# Patient Record
Sex: MALE | Race: WHITE | Employment: OTHER | URBAN - METROPOLITAN AREA
[De-identification: names, ages, dates, MRNs, and addresses within clinical notes are randomized per-mention and may not be internally consistent; named-entity substitution may affect disease eponyms.]

---

## 2017-06-30 ENCOUNTER — HOSPITAL ENCOUNTER (OUTPATIENT)
Dept: GENERAL RADIOLOGY | Age: 62
Discharge: HOME | End: 2017-06-30

## 2017-06-30 ENCOUNTER — OFFICE VISIT (OUTPATIENT)
Dept: FAMILY MEDICINE CLINIC | Age: 62
End: 2017-06-30

## 2017-06-30 ENCOUNTER — HOSPITAL ENCOUNTER (OUTPATIENT)
Dept: OTHER | Age: 62
Discharge: HOME | End: 2017-06-30
Attending: FAMILY MEDICINE | Admitting: FAMILY MEDICINE

## 2017-06-30 VITALS
BODY MASS INDEX: 29.93 KG/M2 | HEART RATE: 76 BPM | SYSTOLIC BLOOD PRESSURE: 136 MMHG | HEIGHT: 72 IN | WEIGHT: 221 LBS | DIASTOLIC BLOOD PRESSURE: 88 MMHG | OXYGEN SATURATION: 98 %

## 2017-06-30 DIAGNOSIS — Z23 NEED FOR TDAP VACCINATION: ICD-10-CM

## 2017-06-30 DIAGNOSIS — R07.9 CHEST PAIN, UNSPECIFIED TYPE: ICD-10-CM

## 2017-06-30 DIAGNOSIS — E03.9 HYPOTHYROIDISM, UNSPECIFIED TYPE: ICD-10-CM

## 2017-06-30 DIAGNOSIS — Z13.9 SCREENING: ICD-10-CM

## 2017-06-30 DIAGNOSIS — L71.9 ROSACEA: Primary | ICD-10-CM

## 2017-06-30 DIAGNOSIS — R73.01 ELEVATED FASTING GLUCOSE: ICD-10-CM

## 2017-06-30 DIAGNOSIS — K21.9 GASTROESOPHAGEAL REFLUX DISEASE, ESOPHAGITIS PRESENCE NOT SPECIFIED: ICD-10-CM

## 2017-06-30 LAB
A/G RATIO: 2.2 (ref 1.1–2.2)
ALBUMIN SERPL-MCNC: 4.4 G/DL (ref 3.4–5)
ALP BLD-CCNC: 49 U/L (ref 40–129)
ALT SERPL-CCNC: 24 U/L (ref 10–40)
ANION GAP SERPL CALCULATED.3IONS-SCNC: 16 MMOL/L (ref 3–16)
AST SERPL-CCNC: 18 U/L (ref 15–37)
BASOPHILS ABSOLUTE: 0.1 K/UL (ref 0–0.2)
BASOPHILS RELATIVE PERCENT: 1.1 %
BILIRUB SERPL-MCNC: 0.4 MG/DL (ref 0–1)
BUN BLDV-MCNC: 16 MG/DL (ref 7–20)
CALCIUM SERPL-MCNC: 10 MG/DL (ref 8.3–10.6)
CHLORIDE BLD-SCNC: 98 MMOL/L (ref 99–110)
CO2: 24 MMOL/L (ref 21–32)
CREAT SERPL-MCNC: 1 MG/DL (ref 0.8–1.3)
EOSINOPHILS ABSOLUTE: 0.4 K/UL (ref 0–0.6)
EOSINOPHILS RELATIVE PERCENT: 5.7 %
GFR AFRICAN AMERICAN: >60
GFR NON-AFRICAN AMERICAN: >60
GLOBULIN: 2 G/DL
GLUCOSE BLD-MCNC: 104 MG/DL (ref 70–99)
HAV IGM SER IA-ACNC: NORMAL
HCT VFR BLD CALC: 44 % (ref 40.5–52.5)
HEMOGLOBIN: 14.6 G/DL (ref 13.5–17.5)
HEPATITIS B CORE IGM ANTIBODY: NORMAL
HEPATITIS B SURFACE ANTIGEN INTERPRETATION: NORMAL
HEPATITIS C ANTIBODY INTERPRETATION: NORMAL
LYMPHOCYTES ABSOLUTE: 2 K/UL (ref 1–5.1)
LYMPHOCYTES RELATIVE PERCENT: 29.2 %
MCH RBC QN AUTO: 32.3 PG (ref 26–34)
MCHC RBC AUTO-ENTMCNC: 33.1 G/DL (ref 31–36)
MCV RBC AUTO: 97.6 FL (ref 80–100)
MONOCYTES ABSOLUTE: 0.5 K/UL (ref 0–1.3)
MONOCYTES RELATIVE PERCENT: 7.8 %
NEUTROPHILS ABSOLUTE: 3.8 K/UL (ref 1.7–7.7)
NEUTROPHILS RELATIVE PERCENT: 56.2 %
PDW BLD-RTO: 14.4 % (ref 12.4–15.4)
PLATELET # BLD: 232 K/UL (ref 135–450)
PMV BLD AUTO: 9.4 FL (ref 5–10.5)
POTASSIUM SERPL-SCNC: 4.7 MMOL/L (ref 3.5–5.1)
RBC # BLD: 4.51 M/UL (ref 4.2–5.9)
SODIUM BLD-SCNC: 138 MMOL/L (ref 136–145)
TOTAL PROTEIN: 6.4 G/DL (ref 6.4–8.2)
TSH SERPL DL<=0.05 MIU/L-ACNC: 2.31 UIU/ML (ref 0.27–4.2)
WBC # BLD: 6.7 K/UL (ref 4–11)

## 2017-06-30 PROCEDURE — 90471 IMMUNIZATION ADMIN: CPT | Performed by: FAMILY MEDICINE

## 2017-06-30 PROCEDURE — 90715 TDAP VACCINE 7 YRS/> IM: CPT | Performed by: FAMILY MEDICINE

## 2017-06-30 PROCEDURE — 36415 COLL VENOUS BLD VENIPUNCTURE: CPT | Performed by: FAMILY MEDICINE

## 2017-06-30 PROCEDURE — 93000 ELECTROCARDIOGRAM COMPLETE: CPT | Performed by: FAMILY MEDICINE

## 2017-06-30 PROCEDURE — 99203 OFFICE O/P NEW LOW 30 MIN: CPT | Performed by: FAMILY MEDICINE

## 2017-06-30 RX ORDER — CHOLECALCIFEROL (VITAMIN D3) 1250 MCG
CAPSULE ORAL
COMMUNITY
End: 2018-05-09

## 2017-06-30 RX ORDER — AZELAIC ACID 0.15 G/G
GEL TOPICAL SEE ADMIN INSTRUCTIONS
COMMUNITY
End: 2019-05-10

## 2017-06-30 RX ORDER — MINOCYCLINE HYDROCHLORIDE 100 MG/1
100 CAPSULE ORAL 2 TIMES DAILY
COMMUNITY
End: 2017-10-24 | Stop reason: SDUPTHER

## 2017-06-30 RX ORDER — LEVOTHYROXINE SODIUM 0.05 MG/1
50 TABLET ORAL DAILY
COMMUNITY
End: 2017-09-26 | Stop reason: SDUPTHER

## 2017-06-30 RX ORDER — OMEPRAZOLE 20 MG/1
20 CAPSULE, DELAYED RELEASE ORAL 2 TIMES DAILY
COMMUNITY
End: 2017-10-24

## 2017-06-30 ASSESSMENT — ENCOUNTER SYMPTOMS
COUGH: 0
ABDOMINAL DISTENTION: 0
CONSTIPATION: 0
CHEST TIGHTNESS: 0
ANAL BLEEDING: 0
BLOOD IN STOOL: 0
ABDOMINAL PAIN: 0
SHORTNESS OF BREATH: 0
EYE DISCHARGE: 0
DIARRHEA: 0

## 2017-06-30 ASSESSMENT — PATIENT HEALTH QUESTIONNAIRE - PHQ9
SUM OF ALL RESPONSES TO PHQ9 QUESTIONS 1 & 2: 0
SUM OF ALL RESPONSES TO PHQ QUESTIONS 1-9: 0
1. LITTLE INTEREST OR PLEASURE IN DOING THINGS: 0
2. FEELING DOWN, DEPRESSED OR HOPELESS: 0

## 2017-07-01 LAB
ESTIMATED AVERAGE GLUCOSE: 105.4 MG/DL
HBA1C MFR BLD: 5.3 %

## 2017-07-28 ENCOUNTER — OFFICE VISIT (OUTPATIENT)
Dept: FAMILY MEDICINE CLINIC | Age: 62
End: 2017-07-28

## 2017-07-28 VITALS
DIASTOLIC BLOOD PRESSURE: 88 MMHG | SYSTOLIC BLOOD PRESSURE: 138 MMHG | HEART RATE: 72 BPM | OXYGEN SATURATION: 98 % | BODY MASS INDEX: 29.97 KG/M2 | WEIGHT: 221 LBS

## 2017-07-28 DIAGNOSIS — Z12.11 ENCOUNTER FOR SCREENING COLONOSCOPY: ICD-10-CM

## 2017-07-28 DIAGNOSIS — K21.9 GASTROESOPHAGEAL REFLUX DISEASE, ESOPHAGITIS PRESENCE NOT SPECIFIED: Primary | ICD-10-CM

## 2017-07-28 PROCEDURE — 99213 OFFICE O/P EST LOW 20 MIN: CPT | Performed by: FAMILY MEDICINE

## 2017-07-31 ASSESSMENT — ENCOUNTER SYMPTOMS
SHORTNESS OF BREATH: 0
BLOOD IN STOOL: 0
CHEST TIGHTNESS: 0
ANAL BLEEDING: 0
COUGH: 0
DIARRHEA: 0
ABDOMINAL PAIN: 0
ABDOMINAL DISTENTION: 0
CONSTIPATION: 0

## 2017-08-23 ENCOUNTER — HOSPITAL ENCOUNTER (OUTPATIENT)
Dept: SURGERY | Age: 62
Discharge: HOME | End: 2017-08-23
Attending: INTERNAL MEDICINE | Admitting: INTERNAL MEDICINE

## 2017-08-23 VITALS
RESPIRATION RATE: 18 BRPM | HEIGHT: 72 IN | TEMPERATURE: 97.2 F | OXYGEN SATURATION: 98 % | HEART RATE: 70 BPM | SYSTOLIC BLOOD PRESSURE: 126 MMHG | BODY MASS INDEX: 29.8 KG/M2 | WEIGHT: 220 LBS | DIASTOLIC BLOOD PRESSURE: 76 MMHG

## 2017-08-23 DIAGNOSIS — Z12.11 ENCOUNTER FOR SCREENING FOR MALIGNANT NEOPLASM OF COLON: ICD-10-CM

## 2017-08-23 RX ORDER — LIDOCAINE HYDROCHLORIDE 10 MG/ML
0.1 INJECTION, SOLUTION INFILTRATION; PERINEURAL ONCE
Status: DISCONTINUED | OUTPATIENT
Start: 2017-08-23 | End: 2017-08-24 | Stop reason: HOSPADM

## 2017-08-23 RX ORDER — FLUTICASONE PROPIONATE 50 MCG
1 SPRAY, SUSPENSION (ML) NASAL DAILY
COMMUNITY

## 2017-08-23 RX ORDER — SODIUM CHLORIDE, SODIUM LACTATE, POTASSIUM CHLORIDE, CALCIUM CHLORIDE 600; 310; 30; 20 MG/100ML; MG/100ML; MG/100ML; MG/100ML
INJECTION, SOLUTION INTRAVENOUS CONTINUOUS
Status: DISCONTINUED | OUTPATIENT
Start: 2017-08-23 | End: 2017-08-24 | Stop reason: HOSPADM

## 2017-08-23 RX ADMIN — SODIUM CHLORIDE, SODIUM LACTATE, POTASSIUM CHLORIDE, CALCIUM CHLORIDE: 600; 310; 30; 20 INJECTION, SOLUTION INTRAVENOUS at 10:15

## 2017-08-23 ASSESSMENT — PAIN - FUNCTIONAL ASSESSMENT
PAIN_FUNCTIONAL_ASSESSMENT: 0-10
PAIN_FUNCTIONAL_ASSESSMENT: 0-10

## 2017-08-23 ASSESSMENT — PAIN SCALES - GENERAL
PAINLEVEL_OUTOF10: 0

## 2017-09-26 ENCOUNTER — TELEPHONE (OUTPATIENT)
Dept: FAMILY MEDICINE CLINIC | Age: 62
End: 2017-09-26

## 2017-09-26 DIAGNOSIS — E03.9 HYPOTHYROIDISM, UNSPECIFIED TYPE: Primary | ICD-10-CM

## 2017-09-26 PROCEDURE — 36415 COLL VENOUS BLD VENIPUNCTURE: CPT | Performed by: FAMILY MEDICINE

## 2017-09-26 RX ORDER — LEVOTHYROXINE SODIUM 0.05 MG/1
50 TABLET ORAL DAILY
Qty: 90 TABLET | Refills: 1 | Status: SHIPPED | OUTPATIENT
Start: 2017-09-26 | End: 2017-12-14

## 2017-09-29 ENCOUNTER — NURSE ONLY (OUTPATIENT)
Dept: FAMILY MEDICINE CLINIC | Age: 62
End: 2017-09-29

## 2017-09-29 DIAGNOSIS — E03.9 HYPOTHYROIDISM, UNSPECIFIED TYPE: Primary | ICD-10-CM

## 2017-09-29 LAB — TSH SERPL DL<=0.05 MIU/L-ACNC: 4.21 UIU/ML (ref 0.27–4.2)

## 2017-09-29 PROCEDURE — 36415 COLL VENOUS BLD VENIPUNCTURE: CPT | Performed by: FAMILY MEDICINE

## 2017-10-06 DIAGNOSIS — E03.9 HYPOTHYROIDISM, UNSPECIFIED TYPE: Primary | ICD-10-CM

## 2017-10-24 ENCOUNTER — OFFICE VISIT (OUTPATIENT)
Dept: FAMILY MEDICINE CLINIC | Age: 62
End: 2017-10-24

## 2017-10-24 DIAGNOSIS — K22.4 ESOPHAGEAL SPASM: ICD-10-CM

## 2017-10-24 DIAGNOSIS — K21.9 GASTROESOPHAGEAL REFLUX DISEASE, ESOPHAGITIS PRESENCE NOT SPECIFIED: Primary | ICD-10-CM

## 2017-10-24 PROCEDURE — G8427 DOCREV CUR MEDS BY ELIG CLIN: HCPCS | Performed by: FAMILY MEDICINE

## 2017-10-24 PROCEDURE — 3017F COLORECTAL CA SCREEN DOC REV: CPT | Performed by: FAMILY MEDICINE

## 2017-10-24 PROCEDURE — G8484 FLU IMMUNIZE NO ADMIN: HCPCS | Performed by: FAMILY MEDICINE

## 2017-10-24 PROCEDURE — 99213 OFFICE O/P EST LOW 20 MIN: CPT | Performed by: FAMILY MEDICINE

## 2017-10-24 PROCEDURE — G8417 CALC BMI ABV UP PARAM F/U: HCPCS | Performed by: FAMILY MEDICINE

## 2017-10-24 PROCEDURE — 1036F TOBACCO NON-USER: CPT | Performed by: FAMILY MEDICINE

## 2017-10-24 RX ORDER — ESOMEPRAZOLE MAGNESIUM 40 MG/1
40 CAPSULE, DELAYED RELEASE ORAL DAILY
Qty: 30 CAPSULE | Refills: 1 | Status: SHIPPED | OUTPATIENT
Start: 2017-10-24 | End: 2018-05-09

## 2017-10-24 RX ORDER — MINOCYCLINE HYDROCHLORIDE 100 MG/1
100 CAPSULE ORAL 2 TIMES DAILY
Qty: 180 CAPSULE | Refills: 3 | Status: SHIPPED | OUTPATIENT
Start: 2017-10-24 | End: 2018-05-10 | Stop reason: ALTCHOICE

## 2017-10-24 ASSESSMENT — ENCOUNTER SYMPTOMS
ABDOMINAL DISTENTION: 0
NAUSEA: 1
CHEST TIGHTNESS: 0
COUGH: 0
ABDOMINAL PAIN: 0
SHORTNESS OF BREATH: 0
VOMITING: 0
TROUBLE SWALLOWING: 1

## 2017-10-24 NOTE — PROGRESS NOTES
Subjective:      Patient ID: Jeanette Dancer is a 60 y.o. male presenting with atypical substernal chest pain    HPI Pt visited the office in July and was started on 2 Omeprazole with success. In the last 3 weeks, he states the medication has not been working as well as previously. He describes atypical substernal chest pain with no radiation and no relation to exertation. Had clean EKG last office visit. No known precipitating factors besides not taking omeprazole then eating, however, sometimes will happen in sleep and wake him up. Morning nausea brought on by taking pills, but subsides after 30-45 minutes. Vomited <5 times since July, and has no history of vomiting undigested food. Eating less in one meal than normal, but can eat multiple smaller meals and reports no weight loss. Actually has gained weight due to less exercise. Review of Systems   Constitutional: Positive for appetite change (less in one meal). Negative for activity change and unexpected weight change. HENT: Positive for trouble swallowing (only with dry food, or at night). Respiratory: Negative for cough, chest tightness and shortness of breath. Cardiovascular: Positive for chest pain (atypical, no radiation, not relieved with rest or exacerbated with exercise). Gastrointestinal: Positive for nausea. Negative for abdominal distention, abdominal pain and vomiting. /80   Pulse 73   Wt 223 lb (101.2 kg)   SpO2 98%   BMI 30.24 kg/m²    Objective:   Physical Exam   Constitutional: He appears well-developed and well-nourished. HENT:   Head: Normocephalic and atraumatic. Eyes: Conjunctivae are normal. Right eye exhibits no discharge. Left eye exhibits no discharge. No scleral icterus. Neck: Normal range of motion. Neck supple. Cardiovascular: Normal rate, regular rhythm, normal heart sounds and intact distal pulses. Exam reveals no gallop and no friction rub. No murmur heard.   Pulmonary/Chest: Effort normal and breath sounds normal. No respiratory distress. He has no wheezes. He has no rales. He exhibits no tenderness. Abdominal: Soft. Bowel sounds are normal.   Skin: Skin is warm. No rash noted. BP (!) 148/90 (Site: Left Arm, Position: Sitting)   Pulse 73   Wt 223 lb (101.2 kg)   SpO2 98%   BMI 30.24 kg/m²       Assessment:      See Below      Plan:       Change prilosec to nexium and refer to GI for further eval      Zackary Medina was seen today for spasms and nausea. Diagnoses and all orders for this visit:    Gastroesophageal reflux disease, esophagitis presence not specified  -     Amb External Referral To Gastroenterology    Esophageal spasm  -     Amb External Referral To Gastroenterology    Other orders  -     minocycline (MINOCIN;DYNACIN) 100 MG capsule; Take 1 capsule by mouth 2 times daily  -     esomeprazole (NEXIUM) 40 MG delayed release capsule;  Take 1 capsule by mouth daily  -  D/c omeprazole

## 2017-10-25 VITALS
OXYGEN SATURATION: 98 % | SYSTOLIC BLOOD PRESSURE: 130 MMHG | BODY MASS INDEX: 30.24 KG/M2 | DIASTOLIC BLOOD PRESSURE: 80 MMHG | HEART RATE: 73 BPM | WEIGHT: 223 LBS

## 2017-10-27 DIAGNOSIS — G47.30 SLEEP APNEA, UNSPECIFIED TYPE: Primary | ICD-10-CM

## 2017-11-06 ENCOUNTER — HOSPITAL ENCOUNTER (OUTPATIENT)
Dept: SURGERY | Age: 62
Discharge: HOME | End: 2017-11-06
Attending: INTERNAL MEDICINE | Admitting: INTERNAL MEDICINE

## 2017-11-06 VITALS
SYSTOLIC BLOOD PRESSURE: 160 MMHG | HEART RATE: 68 BPM | RESPIRATION RATE: 16 BRPM | TEMPERATURE: 97.6 F | WEIGHT: 220 LBS | OXYGEN SATURATION: 98 % | DIASTOLIC BLOOD PRESSURE: 95 MMHG | BODY MASS INDEX: 29.8 KG/M2 | HEIGHT: 72 IN

## 2017-11-06 DIAGNOSIS — Z87.19 PERSONAL HISTORY OF OTHER DISEASES OF THE DIGESTIVE SYSTEM (CODE): ICD-10-CM

## 2017-11-06 RX ORDER — SODIUM CHLORIDE, SODIUM LACTATE, POTASSIUM CHLORIDE, CALCIUM CHLORIDE 600; 310; 30; 20 MG/100ML; MG/100ML; MG/100ML; MG/100ML
INJECTION, SOLUTION INTRAVENOUS CONTINUOUS
Status: DISCONTINUED | OUTPATIENT
Start: 2017-11-06 | End: 2017-11-07 | Stop reason: HOSPADM

## 2017-11-06 RX ORDER — LIDOCAINE HYDROCHLORIDE 10 MG/ML
0.1 INJECTION, SOLUTION INFILTRATION; PERINEURAL ONCE
Status: DISCONTINUED | OUTPATIENT
Start: 2017-11-06 | End: 2017-11-07 | Stop reason: HOSPADM

## 2017-11-06 RX ADMIN — SODIUM CHLORIDE, SODIUM LACTATE, POTASSIUM CHLORIDE, CALCIUM CHLORIDE: 600; 310; 30; 20 INJECTION, SOLUTION INTRAVENOUS at 13:05

## 2017-11-06 ASSESSMENT — PAIN SCALES - GENERAL
PAINLEVEL_OUTOF10: 0
PAINLEVEL_OUTOF10: 0

## 2017-11-06 ASSESSMENT — PAIN - FUNCTIONAL ASSESSMENT: PAIN_FUNCTIONAL_ASSESSMENT: 0-10

## 2017-11-06 NOTE — BRIEF OP NOTE
Post-Sedation Assessment  Clair Parker   11/6/2017  A pre-sedation re-evaluation was performed immediately prior to beginning the procedure.   Procedure: EGD  Preprocedure Dx: chest pain  Postprocedure Dx: irregular zline  Medications: Procedural sedation with Fentanyl, Versed  Complications: None  Estimated Blood Loss: <10cc  Specimens: were obtained    Harvinder Roberts

## 2017-11-06 NOTE — PROGRESS NOTES
Discharge instructions reviewed with patient/responsible adult and understanding verbalized. Discharge instructions signed and copies given. Patient discharged home with belongings. Discharge in no distress: accompanied pt to passenger side of car with family/significant other driving. Resp WNL. Pt's significant other verbalized understanding of d/c instructions and verbalizes no additional questions.  Pain  in a tolerable level=0

## 2017-11-07 NOTE — PROCEDURES
mucosal abnormalities nor gastric fold  thickening. Scope was then withdrawn through the GE junction. A  slightly irregular Z-line with one salmon-colored island of mucosa  located proximal to the Z-line by 1 cm was noted. These areas were  biopsied. No obvious ulcerations, nodules or masses were seen  throughout the esophagus. Biopsies of the mid and proximal esophagus  were obtained to screen for eosinophilic esophagitis. The scope was  then withdrawn from the patient. The procedure was terminated, was  well tolerated, and no immediate complications. POSTPROCEDURE DIAGNOSIS:  Irregular Z-line. An obvious etiology of this patient's chest pain is not seen on this  examination. It is possible that his pain may be related to acid  reflux or perhaps an esophageal motility disorder such as diffuse  esophageal spasm. I think achalasia is unlikely. I will contact him  with biopsy results next week. I have not made changes to his  medication regimen at this time.         Merlin Cai, MD    D: 11/06/2017 14:24:53       T: 11/06/2017 14:38:46     [email or web address]  Job#: [id number]     Doc#: [id number]    CC:  Alvin Recio MD

## 2017-11-17 ENCOUNTER — OFFICE VISIT (OUTPATIENT)
Dept: FAMILY MEDICINE CLINIC | Age: 62
End: 2017-11-17

## 2017-11-17 VITALS
HEART RATE: 88 BPM | BODY MASS INDEX: 30.92 KG/M2 | OXYGEN SATURATION: 98 % | SYSTOLIC BLOOD PRESSURE: 130 MMHG | WEIGHT: 228 LBS | DIASTOLIC BLOOD PRESSURE: 80 MMHG

## 2017-11-17 DIAGNOSIS — K21.00 GASTROESOPHAGEAL REFLUX DISEASE WITH ESOPHAGITIS: Primary | ICD-10-CM

## 2017-11-17 DIAGNOSIS — Z23 NEED FOR INFLUENZA VACCINATION: ICD-10-CM

## 2017-11-17 LAB
A/G RATIO: 2.3 (ref 1.1–2.2)
ALBUMIN SERPL-MCNC: 4.3 G/DL (ref 3.4–5)
ALP BLD-CCNC: 50 U/L (ref 40–129)
ALT SERPL-CCNC: 29 U/L (ref 10–40)
ANION GAP SERPL CALCULATED.3IONS-SCNC: 16 MMOL/L (ref 3–16)
AST SERPL-CCNC: 21 U/L (ref 15–37)
BILIRUB SERPL-MCNC: 0.3 MG/DL (ref 0–1)
BUN BLDV-MCNC: 19 MG/DL (ref 7–20)
CALCIUM SERPL-MCNC: 10 MG/DL (ref 8.3–10.6)
CHLORIDE BLD-SCNC: 101 MMOL/L (ref 99–110)
CO2: 27 MMOL/L (ref 21–32)
CREAT SERPL-MCNC: 1.2 MG/DL (ref 0.8–1.3)
GFR AFRICAN AMERICAN: >60
GFR NON-AFRICAN AMERICAN: >60
GLOBULIN: 1.9 G/DL
GLUCOSE BLD-MCNC: 112 MG/DL (ref 70–99)
POTASSIUM SERPL-SCNC: 5.2 MMOL/L (ref 3.5–5.1)
SODIUM BLD-SCNC: 144 MMOL/L (ref 136–145)
TOTAL PROTEIN: 6.2 G/DL (ref 6.4–8.2)
TSH SERPL DL<=0.05 MIU/L-ACNC: 2.14 UIU/ML (ref 0.27–4.2)
VITAMIN B-12: 334 PG/ML (ref 211–911)

## 2017-11-17 PROCEDURE — 3017F COLORECTAL CA SCREEN DOC REV: CPT | Performed by: FAMILY MEDICINE

## 2017-11-17 PROCEDURE — 99213 OFFICE O/P EST LOW 20 MIN: CPT | Performed by: FAMILY MEDICINE

## 2017-11-17 PROCEDURE — G8427 DOCREV CUR MEDS BY ELIG CLIN: HCPCS | Performed by: FAMILY MEDICINE

## 2017-11-17 PROCEDURE — 90630 INFLUENZA, QUADV, 18-64 YRS, ID, PF, MICRO INJ, 0.1ML (FLUZONE QUADV, PF): CPT | Performed by: FAMILY MEDICINE

## 2017-11-17 PROCEDURE — 36415 COLL VENOUS BLD VENIPUNCTURE: CPT | Performed by: FAMILY MEDICINE

## 2017-11-17 PROCEDURE — 1036F TOBACCO NON-USER: CPT | Performed by: FAMILY MEDICINE

## 2017-11-17 PROCEDURE — G8417 CALC BMI ABV UP PARAM F/U: HCPCS | Performed by: FAMILY MEDICINE

## 2017-11-17 PROCEDURE — 90471 IMMUNIZATION ADMIN: CPT | Performed by: FAMILY MEDICINE

## 2017-11-17 PROCEDURE — G8484 FLU IMMUNIZE NO ADMIN: HCPCS | Performed by: FAMILY MEDICINE

## 2017-11-17 RX ORDER — RANITIDINE 150 MG/1
150 TABLET ORAL 2 TIMES DAILY
Qty: 60 TABLET | Refills: 1 | Status: SHIPPED | OUTPATIENT
Start: 2017-11-17 | End: 2018-05-09

## 2017-11-17 NOTE — PROGRESS NOTES
Vaccine Information Sheet, \"Influenza - Inactivated\"  given to Bill Xie, or parent/legal guardian of  Bill Xie and verbalized understanding. Patient responses:    Have you ever had a reaction to a flu vaccine? No  Are you able to eat eggs without adverse effects? Yes  Do you have any current illness? No  Have you ever had Guillian Rush Syndrome? No    Flu vaccine given per order. Please see immunization tab.

## 2017-11-19 ASSESSMENT — ENCOUNTER SYMPTOMS
CHEST TIGHTNESS: 0
SHORTNESS OF BREATH: 0
BLOOD IN STOOL: 0
ABDOMINAL PAIN: 1
CONSTIPATION: 0
DIARRHEA: 0
ANAL BLEEDING: 0
ABDOMINAL DISTENTION: 0
COUGH: 0
EYE DISCHARGE: 0

## 2017-11-27 ENCOUNTER — TELEPHONE (OUTPATIENT)
Dept: FAMILY MEDICINE CLINIC | Age: 62
End: 2017-11-27

## 2017-11-27 DIAGNOSIS — K21.9 GASTROESOPHAGEAL REFLUX DISEASE WITHOUT ESOPHAGITIS: Primary | ICD-10-CM

## 2017-11-27 NOTE — TELEPHONE ENCOUNTER
Patient is calling to let Dr. Neff know how the Zantac is doing. Stated that it seems to work pretty well, worked everyday except yesterday. Stated the nexium is still not working. It only works half of the time and it runs out before it is time to take the next pill. Please advise.

## 2017-11-28 ENCOUNTER — TELEPHONE (OUTPATIENT)
Dept: FAMILY MEDICINE CLINIC | Age: 62
End: 2017-11-28

## 2017-11-28 NOTE — TELEPHONE ENCOUNTER
Kristine Shah with Dr. Milton Noriega office called and stated that the patient called to make an appointment with Dr. Ramiro Obando. She said that the patient said that Dr. Almita Trotter wanted him to see a different doctor in the their practice. She said that he had a EGD on 11.06.17 by Dr. Carrie Smith. Kristine Shah said that she wanted to clarify if the Dr. Almita Trotter wants him seen by Dr. Ramiro Obando or if it is okay for him to see Dr. Carrie Smith again. Please advise.

## 2017-12-12 ENCOUNTER — OFFICE VISIT (OUTPATIENT)
Dept: PULMONOLOGY | Age: 62
End: 2017-12-12

## 2017-12-12 VITALS
DIASTOLIC BLOOD PRESSURE: 91 MMHG | RESPIRATION RATE: 16 BRPM | SYSTOLIC BLOOD PRESSURE: 168 MMHG | TEMPERATURE: 97.3 F | BODY MASS INDEX: 30.88 KG/M2 | WEIGHT: 228 LBS | HEIGHT: 72 IN

## 2017-12-12 DIAGNOSIS — R06.81 WITNESSED EPISODE OF APNEA: ICD-10-CM

## 2017-12-12 DIAGNOSIS — R06.83 SNORING: Primary | ICD-10-CM

## 2017-12-12 DIAGNOSIS — G47.10 HYPERSOMNIA: ICD-10-CM

## 2017-12-12 PROCEDURE — G8427 DOCREV CUR MEDS BY ELIG CLIN: HCPCS | Performed by: INTERNAL MEDICINE

## 2017-12-12 PROCEDURE — 99203 OFFICE O/P NEW LOW 30 MIN: CPT | Performed by: INTERNAL MEDICINE

## 2017-12-12 PROCEDURE — G8417 CALC BMI ABV UP PARAM F/U: HCPCS | Performed by: INTERNAL MEDICINE

## 2017-12-12 PROCEDURE — G8484 FLU IMMUNIZE NO ADMIN: HCPCS | Performed by: INTERNAL MEDICINE

## 2017-12-12 PROCEDURE — 3017F COLORECTAL CA SCREEN DOC REV: CPT | Performed by: INTERNAL MEDICINE

## 2017-12-12 RX ORDER — OMEPRAZOLE 40 MG/1
40 CAPSULE, DELAYED RELEASE ORAL 2 TIMES DAILY
COMMUNITY
End: 2018-12-13 | Stop reason: ALTCHOICE

## 2017-12-12 ASSESSMENT — SLEEP AND FATIGUE QUESTIONNAIRES
HOW LIKELY ARE YOU TO NOD OFF OR FALL ASLEEP WHILE SITTING AND READING: 3
NECK CIRCUMFERENCE (INCHES): 17.5
HOW LIKELY ARE YOU TO NOD OFF OR FALL ASLEEP WHILE SITTING INACTIVE IN A PUBLIC PLACE: 0
HOW LIKELY ARE YOU TO NOD OFF OR FALL ASLEEP WHILE LYING DOWN TO REST IN THE AFTERNOON WHEN CIRCUMSTANCES PERMIT: 3
HOW LIKELY ARE YOU TO NOD OFF OR FALL ASLEEP WHILE WATCHING TV: 3
HOW LIKELY ARE YOU TO NOD OFF OR FALL ASLEEP WHILE SITTING QUIETLY AFTER LUNCH WITHOUT ALCOHOL: 3
ESS TOTAL SCORE: 15
HOW LIKELY ARE YOU TO NOD OFF OR FALL ASLEEP WHEN YOU ARE A PASSENGER IN A CAR FOR AN HOUR WITHOUT A BREAK: 3
HOW LIKELY ARE YOU TO NOD OFF OR FALL ASLEEP IN A CAR, WHILE STOPPED FOR A FEW MINUTES IN TRAFFIC: 0
HOW LIKELY ARE YOU TO NOD OFF OR FALL ASLEEP WHILE SITTING AND TALKING TO SOMEONE: 0

## 2017-12-12 NOTE — PATIENT INSTRUCTIONS
Please keep all of your future appointments scheduled by [facility]. Sleep Hygiene. .. Tips for better sleep. .. Avoid naps. This will ensure you are sleepy at bedtime. If you have to take a nap, sleep less than 1 hour, before 3 pm.  Sleep only when sleepy; this reduces the time you are awake in bed. Regular exercise is recommended to help you deepen your sleep, but not within 4-6 hours of your bedtime. Timing of exercise is important, aim to exercise early in the morning or early afternoon. A light snack may help you fall asleep. Warm milk and foods high in the amino acid tryptophan, such as bananas, may help you to sleep  Be sure to avoid heavy, spicy or sugary foods 4-6 hours before bedtime and avoid at snack time. Stay away from stimulants such as caffeine and nicotine for at least 4-6 hours before bed. Stimulants can interfere with your ability to fall asleep. Caffeine is found in tea, cola, coffee, cocoa and chocolate and is best avoided at bedtime. Nicotine is found in tobacco products. Avoid alcohol 4-6 hours before bedtime. Alcohol has an immediate sleep-inducing effect, after a few hours when alcohol levels fall there is a stimulant or wake-up effect and will cause fragmented sleep. Sleep rituals are important. Give your body clues it is time to slow down and sleep. Examples include; yoga, deep breathing, listen to relaxing music, a hot bath or a few minutes of reading. Have a fixed bedtime and awakening time, Even on weekends! You will feel better keeping a regular sleep cycle, even if you are retired or not working. Get into your favorite sleep position. If not asleep in 30 minutes, get up and do something boring until you feel sleepy. Remember not to expose yourself to bright lights such as TV, phone or tablet screens. Only use your bed for sleeping. Do not use your bed as an office, workroom or recreation room. Use comfortable bedding.

## 2017-12-12 NOTE — LETTER
[address] [address]  Phone: [phone]  Fax: [phone]    January 18, 2018     Patient: Nuria Abel   MR Number: [id number]   YOB: 1955   Date of Visit: 12/12/2017     Dear Dr. Christos Culp: Thank you for the request for consultation for Nuria Abel to me for the evaluation of snoring/witnessed apnea. Below are the relevant portions of my assessment and plan of care. Assessment:       · Snoring  · Witnessed apnea   · Hypersomnia       Plan:  ·   Split night evaluate and treat for sleep related breathing disorder. Treatment options were discussed with patient if PSG reveals REINIER, including CPAP therapy, oral appliances, upper airway surgery and hypoglossal nerve stimulation. Patient is in favor of CPAP therapy. · Discussed with patient the pathophysiology of apnea. · Sleep hygiene  · Avoid sedatives, alcohol and caffeinated drinks at bed time. · No driving motorized vehicles or operating heavy machinery while fatigue, drowsy or sleepy. · Weight loss is also recommended as a long-term intervention. · Complications of REINIER if not treated were discussed with patient patient to include systemic hypertension, pulmonary hypertension, cardiovascular morbidities, car accidents and all cause mortality. If you have questions, please do not hesitate to call me. I look forward to following Gloria Buck along with you.     Sincerely,    Darryl Valdivia MD

## 2017-12-12 NOTE — PROGRESS NOTES
[facility]                                                                  CHIEF COMPLAINT: Sleep apnea evaluation       Consulting provider: Jamilah Castanon MD      HPI:   Snoring at night for the past 20 years. The severity of snoring is severe. Worse in supine position and better on the side. Has witnessed apnea. Recent colonoscopy with witnessed apnea. Wakes up at night choking and gasping for air. + dry mouth upon awakening. Fatigue and tiredness during the day. Bedtime 9 pm and rise time is 7  am. Sleep onset 2 minutes. 5 naps/week 3-60 min. No car wrecks or near wrecks because of the sleepiness. No nodding off while driving. ESS is 15. Past Medical History:   Diagnosis Date    Rosacea     Thyroid disease        Past Surgical History:        Procedure Laterality Date    COLONOSCOPY  2012    COLONOSCOPY  08/23/2017    polyp    UPPER GASTROINTESTINAL ENDOSCOPY  11/06/2017    WISDOM TOOTH EXTRACTION  2015       Allergies:  is allergic to latex and sulfa antibiotics. Social History:    TOBACCO:   reports that he quit smoking about 8 years ago. His smoking use included Cigarettes. He has a 38.00 pack-year smoking history. He has quit using smokeless tobacco.  ETOH:   reports that he drinks about 1.2 - 1.8 oz of alcohol per week .       Family History:       Problem Relation Age of Onset    Heart Disease Mother     High Blood Pressure Mother     Arthritis Mother     Cancer Father     Alcohol Abuse Brother        Current Medications:    Current Outpatient Prescriptions:     omeprazole (PRILOSEC) 40 MG delayed release capsule, Take 40 mg by mouth daily, Disp: , Rfl:     minocycline (MINOCIN;DYNACIN) 100 MG capsule, Take 1 capsule by mouth 2 times daily, Disp: 180 capsule, Rfl: 3    esomeprazole (NEXIUM) 40 MG delayed release capsule, Take 1 capsule by mouth daily, Disp: 30 capsule, Rfl: 1    levothyroxine (SYNTHROID) 50 MCG tablet, Take 1 tablet by mouth Daily (Patient taking differently: Take 75 mcg by mouth Daily ), Disp: 90 tablet, Rfl: 1    fluticasone (FLONASE) 50 MCG/ACT nasal spray, 1 spray by Nasal route daily, Disp: , Rfl:     Azelaic Acid (FINACEA) 15 % GEL, Apply topically See Admin Instructions, Disp: , Rfl:     Cholecalciferol (VITAMIN D3) 40212 units CAPS, Take by mouth, Disp: , Rfl:     ranitidine (ZANTAC) 150 MG tablet, Take 1 tablet by mouth 2 times daily, Disp: 60 tablet, Rfl: 1      REVIEW OF SYSTEMS:  Constitutional: Negative for fever  HENT: Negative for sore throat  Eyes: Negative for redness   Respiratory: Negative for dyspnea, cough  Cardiovascular: Negative for chest pain  Gastrointestinal: Negative for vomiting, diarrhea   Genitourinary: Negative for hematuria   Musculoskeletal: Negative for arthralgias   Skin: Negative for rash  Neurological: Negative for syncope  Hematological: Negative for adenopathy  Psychiatric/Behavorial: Negative for anxiety      Objective:   PHYSICAL EXAM:    Blood pressure (!) 168/91, temperature 97.3 °F (36.3 °C), temperature source Oral, resp. rate 16, height 6' (1.829 m), weight 228 lb (103.4 kg). ' on RA  Gen: No distress. Obese. BMI of 30.92  Eyes: PERRL. No sclera icterus. No conjunctival injection. ENT: No discharge. Pharynx clear. Mallampati class IV. Neck: Trachea midline. No obvious mass. Neck circumference 17.5\"  Resp: No accessory muscle use. No crackles. No wheezes. No rhonchi. No dullness on percussion. Good air entry. CV: Regular rate. Regular rhythm. No murmur or rub. No edema. GI: Non-tender. Non-distended. No hernia. Skin: Warm and dry. No nodule on exposed extremities. Lymph: No cervical LAD. No supraclavicular LAD. M/S: No cyanosis. No joint deformity. No clubbing. Neuro: Awake. Alert. Moves all four extremities. Psych: Oriented x 3. No anxiety.          DATA reviewed by me:       Assessment:       · Snoring  · Witnessed apnea · Hypersomnia       Plan:    · Split night evaluate and treat for sleep related breathing disorder. Treatment options were discussed with patient if PSG reveals REINIER, including CPAP therapy, oral appliances, upper airway surgery and hypoglossal nerve stimulation. Patient is in favor of CPAP therapy. · Discussed with patient the pathophysiology of apnea. · Sleep hygiene  · Avoid sedatives, alcohol and caffeinated drinks at bed time. · No driving motorized vehicles or operating heavy machinery while fatigue, drowsy or sleepy. · Weight loss is also recommended as a long-term intervention. · Complications of REINIER if not treated were discussed with patient patient to include systemic hypertension, pulmonary hypertension, cardiovascular morbidities, car accidents and all cause mortality.

## 2017-12-14 RX ORDER — LEVOTHYROXINE SODIUM 0.07 MG/1
75 TABLET ORAL DAILY
Qty: 90 TABLET | Refills: 1 | Status: SHIPPED | OUTPATIENT
Start: 2017-12-14 | End: 2018-08-14 | Stop reason: SDUPTHER

## 2017-12-14 NOTE — TELEPHONE ENCOUNTER
Patient is calling for a refill on levothyroxine. He stated that Alban Spatz raised him to 75mcg but the prescription has been coming 50 mcg. He is asking for a new prescription be sent to [facility] so he doesn't have to cut them in half anymore.

## 2017-12-26 ENCOUNTER — TELEPHONE (OUTPATIENT)
Dept: PULMONOLOGY | Age: 62
End: 2017-12-26

## 2017-12-26 DIAGNOSIS — R06.81 WITNESSED APNEIC SPELLS: ICD-10-CM

## 2017-12-26 DIAGNOSIS — G47.10 HYPERSOMNIA: ICD-10-CM

## 2017-12-26 DIAGNOSIS — R06.83 SNORING: Primary | ICD-10-CM

## 2017-12-26 NOTE — TELEPHONE ENCOUNTER
[facility] will not approve an In lab split night sleep study d/t patient does not have any co-morbidities that would contraindicate a home sleep test.  Would a home sleep test be ok? If you prefer a peer to peer can be done by calling [phone] refer to member ID # [id number]. Please let me know how you would like to proceed with this determination so I know how to proceed on my end.

## 2018-01-31 ENCOUNTER — HOSPITAL ENCOUNTER (OUTPATIENT)
Dept: SLEEP MEDICINE | Age: 63
Discharge: HOME | End: 2018-01-31
Attending: INTERNAL MEDICINE | Admitting: INTERNAL MEDICINE

## 2018-01-31 DIAGNOSIS — R06.81 WITNESSED APNEIC SPELLS: ICD-10-CM

## 2018-01-31 DIAGNOSIS — R06.83 SNORING: ICD-10-CM

## 2018-01-31 DIAGNOSIS — G47.10 HYPERSOMNIA: ICD-10-CM

## 2018-02-02 ENCOUNTER — TELEPHONE (OUTPATIENT)
Dept: PULMONOLOGY | Age: 63
End: 2018-02-02

## 2018-02-02 DIAGNOSIS — G47.33 OSA (OBSTRUCTIVE SLEEP APNEA): Primary | ICD-10-CM

## 2018-02-06 ENCOUNTER — TELEPHONE (OUTPATIENT)
Dept: PULMONOLOGY | Age: 63
End: 2018-02-06

## 2018-02-06 DIAGNOSIS — G47.33 OSA (OBSTRUCTIVE SLEEP APNEA): Primary | ICD-10-CM

## 2018-02-06 NOTE — TELEPHONE ENCOUNTER
[facility] will not approve an In lab titration study d/t patient does not have any co-morbidities that would contraindicate a trial of auto pap or have already failed a trial of auto pap. Would you be ok with a trial of auto pap? If you do not agree with this determination a peer to peer can be done by calling [phone] to reference # [id number]. Please let me know how you would like to proceed with this determination so I know how to proceed on my end.

## 2018-03-27 ENCOUNTER — TELEPHONE (OUTPATIENT)
Dept: PULMONOLOGY | Age: 63
End: 2018-03-27

## 2018-04-17 ENCOUNTER — HOSPITAL ENCOUNTER (OUTPATIENT)
Dept: ULTRASOUND IMAGING | Age: 63
Discharge: HOME | End: 2018-04-17
Attending: INTERNAL MEDICINE | Admitting: INTERNAL MEDICINE

## 2018-04-17 DIAGNOSIS — R10.11 RIGHT UPPER QUADRANT ABDOMINAL PAIN: ICD-10-CM

## 2018-04-17 DIAGNOSIS — R10.9 ABDOMINAL PAIN: ICD-10-CM

## 2018-04-19 ENCOUNTER — HOSPITAL ENCOUNTER (OUTPATIENT)
Dept: NUCLEAR MEDICINE | Age: 63
Discharge: HOME | End: 2018-04-19
Attending: INTERNAL MEDICINE | Admitting: INTERNAL MEDICINE

## 2018-04-19 DIAGNOSIS — R68.81 EARLY SATIETY: ICD-10-CM

## 2018-04-19 DIAGNOSIS — R10.9 ABDOMINAL PAIN: ICD-10-CM

## 2018-04-19 RX ADMIN — Medication 1 MILLICURIE: at 09:06

## 2018-05-09 ENCOUNTER — OFFICE VISIT (OUTPATIENT)
Dept: FAMILY MEDICINE CLINIC | Age: 63
End: 2018-05-09

## 2018-05-09 VITALS
SYSTOLIC BLOOD PRESSURE: 132 MMHG | OXYGEN SATURATION: 98 % | WEIGHT: 224 LBS | BODY MASS INDEX: 30.38 KG/M2 | DIASTOLIC BLOOD PRESSURE: 80 MMHG | HEART RATE: 83 BPM

## 2018-05-09 DIAGNOSIS — R73.9 HYPERGLYCEMIA: ICD-10-CM

## 2018-05-09 DIAGNOSIS — K21.9 GASTROESOPHAGEAL REFLUX DISEASE, ESOPHAGITIS PRESENCE NOT SPECIFIED: ICD-10-CM

## 2018-05-09 DIAGNOSIS — Z00.00 ANNUAL PHYSICAL EXAM: Primary | ICD-10-CM

## 2018-05-09 DIAGNOSIS — E03.9 HYPOTHYROIDISM, UNSPECIFIED TYPE: ICD-10-CM

## 2018-05-09 DIAGNOSIS — E55.9 VITAMIN D INSUFFICIENCY: ICD-10-CM

## 2018-05-09 DIAGNOSIS — Z12.5 SCREENING PSA (PROSTATE SPECIFIC ANTIGEN): ICD-10-CM

## 2018-05-09 LAB — PROSTATE SPECIFIC ANTIGEN: 0.6 NG/ML (ref 0–4)

## 2018-05-09 PROCEDURE — 36415 COLL VENOUS BLD VENIPUNCTURE: CPT | Performed by: FAMILY MEDICINE

## 2018-05-09 PROCEDURE — 99396 PREV VISIT EST AGE 40-64: CPT | Performed by: FAMILY MEDICINE

## 2018-05-09 RX ORDER — SUCRALFATE 1 G/1
1 TABLET ORAL
COMMUNITY
Start: 2018-04-23 | End: 2019-07-08

## 2018-05-09 ASSESSMENT — ENCOUNTER SYMPTOMS: SHORTNESS OF BREATH: 0

## 2018-05-10 ENCOUNTER — TELEPHONE (OUTPATIENT)
Dept: PULMONOLOGY | Age: 63
End: 2018-05-10

## 2018-05-10 ENCOUNTER — OFFICE VISIT (OUTPATIENT)
Dept: PULMONOLOGY | Age: 63
End: 2018-05-10

## 2018-05-10 VITALS
HEART RATE: 70 BPM | RESPIRATION RATE: 16 BRPM | OXYGEN SATURATION: 97 % | BODY MASS INDEX: 30.1 KG/M2 | DIASTOLIC BLOOD PRESSURE: 85 MMHG | WEIGHT: 222.2 LBS | TEMPERATURE: 97.7 F | SYSTOLIC BLOOD PRESSURE: 134 MMHG | HEIGHT: 72 IN

## 2018-05-10 DIAGNOSIS — G47.10 HYPERSOMNIA: ICD-10-CM

## 2018-05-10 DIAGNOSIS — Z71.2 ENCOUNTER TO DISCUSS TEST RESULTS: ICD-10-CM

## 2018-05-10 DIAGNOSIS — R06.83 SNORING: ICD-10-CM

## 2018-05-10 DIAGNOSIS — G47.33 OBSTRUCTIVE SLEEP APNEA SYNDROME: Primary | ICD-10-CM

## 2018-05-10 PROBLEM — G47.30 SLEEP APNEA: Status: ACTIVE | Noted: 2018-05-10

## 2018-05-10 LAB
ESTIMATED AVERAGE GLUCOSE: 105.4 MG/DL
HBA1C MFR BLD: 5.3 %
VITAMIN D 25-HYDROXY: 134 NG/ML

## 2018-05-10 PROCEDURE — G8417 CALC BMI ABV UP PARAM F/U: HCPCS | Performed by: NURSE PRACTITIONER

## 2018-05-10 PROCEDURE — G8427 DOCREV CUR MEDS BY ELIG CLIN: HCPCS | Performed by: NURSE PRACTITIONER

## 2018-05-10 PROCEDURE — 3017F COLORECTAL CA SCREEN DOC REV: CPT | Performed by: NURSE PRACTITIONER

## 2018-05-10 PROCEDURE — 1036F TOBACCO NON-USER: CPT | Performed by: NURSE PRACTITIONER

## 2018-05-10 PROCEDURE — 99214 OFFICE O/P EST MOD 30 MIN: CPT | Performed by: NURSE PRACTITIONER

## 2018-05-10 ASSESSMENT — SLEEP AND FATIGUE QUESTIONNAIRES
NECK CIRCUMFERENCE (INCHES): 17.25
ESS TOTAL SCORE: 12
HOW LIKELY ARE YOU TO NOD OFF OR FALL ASLEEP WHILE SITTING INACTIVE IN A PUBLIC PLACE: 0
HOW LIKELY ARE YOU TO NOD OFF OR FALL ASLEEP WHILE SITTING AND READING: 3
HOW LIKELY ARE YOU TO NOD OFF OR FALL ASLEEP WHEN YOU ARE A PASSENGER IN A CAR FOR AN HOUR WITHOUT A BREAK: 3
HOW LIKELY ARE YOU TO NOD OFF OR FALL ASLEEP WHILE SITTING QUIETLY AFTER LUNCH WITHOUT ALCOHOL: 0
HOW LIKELY ARE YOU TO NOD OFF OR FALL ASLEEP WHILE WATCHING TV: 3
HOW LIKELY ARE YOU TO NOD OFF OR FALL ASLEEP IN A CAR, WHILE STOPPED FOR A FEW MINUTES IN TRAFFIC: 0
HOW LIKELY ARE YOU TO NOD OFF OR FALL ASLEEP WHILE SITTING AND TALKING TO SOMEONE: 0
HOW LIKELY ARE YOU TO NOD OFF OR FALL ASLEEP WHILE LYING DOWN TO REST IN THE AFTERNOON WHEN CIRCUMSTANCES PERMIT: 3

## 2018-08-14 ENCOUNTER — OFFICE VISIT (OUTPATIENT)
Dept: FAMILY MEDICINE CLINIC | Age: 63
End: 2018-08-14

## 2018-08-14 VITALS
DIASTOLIC BLOOD PRESSURE: 84 MMHG | HEART RATE: 100 BPM | BODY MASS INDEX: 30.11 KG/M2 | WEIGHT: 222 LBS | SYSTOLIC BLOOD PRESSURE: 144 MMHG

## 2018-08-14 DIAGNOSIS — I48.91 NEW ONSET A-FIB (HCC): ICD-10-CM

## 2018-08-14 DIAGNOSIS — I49.9 IRREGULAR HEART RATE: Primary | ICD-10-CM

## 2018-08-14 DIAGNOSIS — E03.9 HYPOTHYROIDISM, UNSPECIFIED TYPE: ICD-10-CM

## 2018-08-14 DIAGNOSIS — J06.9 VIRAL URI: ICD-10-CM

## 2018-08-14 LAB — TSH SERPL DL<=0.05 MIU/L-ACNC: 3.41 UIU/ML (ref 0.27–4.2)

## 2018-08-14 PROCEDURE — 36415 COLL VENOUS BLD VENIPUNCTURE: CPT | Performed by: FAMILY MEDICINE

## 2018-08-14 PROCEDURE — 99214 OFFICE O/P EST MOD 30 MIN: CPT | Performed by: FAMILY MEDICINE

## 2018-08-14 PROCEDURE — 93000 ELECTROCARDIOGRAM COMPLETE: CPT | Performed by: FAMILY MEDICINE

## 2018-08-14 PROCEDURE — G8417 CALC BMI ABV UP PARAM F/U: HCPCS | Performed by: FAMILY MEDICINE

## 2018-08-14 PROCEDURE — 1036F TOBACCO NON-USER: CPT | Performed by: FAMILY MEDICINE

## 2018-08-14 PROCEDURE — 3017F COLORECTAL CA SCREEN DOC REV: CPT | Performed by: FAMILY MEDICINE

## 2018-08-14 PROCEDURE — G8427 DOCREV CUR MEDS BY ELIG CLIN: HCPCS | Performed by: FAMILY MEDICINE

## 2018-08-14 RX ORDER — LEVOTHYROXINE SODIUM 0.07 MG/1
75 TABLET ORAL DAILY
Qty: 90 TABLET | Refills: 1 | Status: SHIPPED | OUTPATIENT
Start: 2018-08-14 | End: 2019-01-21 | Stop reason: SDUPTHER

## 2018-08-14 RX ORDER — GUAIFENESIN/DEXTROMETHORPHAN 100-10MG/5
5 SYRUP ORAL 3 TIMES DAILY PRN
Qty: 354 ML | Refills: 0 | Status: SHIPPED | OUTPATIENT
Start: 2018-08-14 | End: 2018-08-24

## 2018-08-14 ASSESSMENT — ENCOUNTER SYMPTOMS
SHORTNESS OF BREATH: 0
WHEEZING: 0
RHINORRHEA: 1
COUGH: 1
SORE THROAT: 0

## 2018-08-14 NOTE — PATIENT INSTRUCTIONS
try to drive yourself.     · You have symptoms of a stroke. These may include:  ¨ Sudden numbness, tingling, weakness, or loss of movement in your face, arm, or leg, especially on only one side of your body. ¨ Sudden vision changes. ¨ Sudden trouble speaking. ¨ Sudden confusion or trouble understanding simple statements. ¨ Sudden problems with walking or balance. ¨ A sudden, severe headache that is different from past headaches.     · You passed out (lost consciousness).    Call your doctor now or seek immediate medical care if:    · You have new or increased shortness of breath.     · You feel dizzy or lightheaded, or you feel like you may faint.     · Your heart rate becomes irregular.     · You can feel your heart flutter in your chest or skip heartbeats. Tell your doctor if these symptoms are new or worse.    Watch closely for changes in your health, and be sure to contact your doctor if you have any problems. Where can you learn more? Go to [email or web address] org and sign in to your [employer] account. Enter U020 in the 10sec box to learn more about \"Atrial Fibrillation: Care Instructions. \"     If you do not have an account, please click on the \"Sign Up Now\" link. Current as of: December 6, 2017  Content Version: 11.7  © 5742-8872 [employer]. Care instructions adapted under license by [facility] ([facility]). If you have questions about a medical condition or this instruction, always ask your healthcare professional. [place] [address] any warranty or liability for your use of this information. Patient Education          metoprolol  Pronunciation:  me TOE pro lol  Brand:  Lopressor, Metoprolol Succinate ER, Metoprolol Tartrate, Toprol-XL  What is the most important information I should know about metoprolol?   You should not use this medication if you have a serious heart problem (heart block, sick sinus syndrome, slow heart rate), severe circulation problems, severe heart failure, or a history of slow heart beats that caused fainting. What is metoprolol? Metoprolol is a beta-blocker that affects the heart and circulation (blood flow through arteries and veins). Metoprolol is used to treat angina (chest pain) and hypertension (high blood pressure). It is also used to treat or prevent heart attack. Metoprolol may also be used for other purposes not listed in this medication guide. What should I discuss with my healthcare provider before taking metoprolol? You should not use this medication if you are allergic to metoprolol, or other beta-blockers (atenolol, carvedilol, labetalol, metoprolol, nadolol, nebivolol, propranolol, sotalol, and others), or if you have:  · a serious heart problem such as heart block, sick sinus syndrome, or slow heart rate;  · severe circulation problems;  · severe heart failure (that required you to be in the hospital); or  · history of slow heart beats that have caused you to faint. To make sure metoprolol is safe for you, tell your doctor if you have:  · asthma, chronic obstructive pulmonary disease (COPD), sleep apnea, or other breathing disorder;  · diabetes (taking metoprolol may make it harder for you to tell when you have low blood sugar);  · liver disease;  · congestive heart failure;  · problems with circulation (such as Raynaud's syndrome);  · a thyroid disorder; or  · pheochromocytoma (tumor of the adrenal gland). It is not known whether metoprolol will harm an unborn baby. Tell your doctor right away if you become pregnant while using this medicine. Metoprolol can pass into breast milk and may harm a nursing baby. Tell your doctor if you are breast-feeding a baby. Metoprolol is not approved for use by anyone younger than 25years old. How should I take metoprolol? Follow all directions on your prescription label. Your doctor may occasionally change your dose to make sure you get the best results.  Do

## 2018-08-14 NOTE — PROGRESS NOTES
Chief Complaint   Patient presents with    Chest Congestion    Irregular Heart Beat         Cough   This is a new problem. The current episode started yesterday. The problem has been gradually worsening. The cough is non-productive. Associated symptoms include nasal congestion, postnasal drip and rhinorrhea. Pertinent negatives include no chills, ear congestion, ear pain, fever, sore throat, shortness of breath or wheezing. Associated symptoms comments: Chest congestion. He has tried nothing for the symptoms. His past medical history is significant for bronchitis. There is no history of asthma. [facility] symptoms had irregular HR and was tachycardic. They were unable to do an EKG. He was asymptomatic. He was sent to our office for evaluation. Today in clinic EKG shows atrial fibrillation. HR is 100. He is still asymptomatic.          Patient Active Problem List   Diagnosis    Hypothyroidism    Gastroesophageal reflux disease    Sleep apnea     Past Medical History:   Diagnosis Date    Rosacea     Sleep apnea     compliant with cpap    Thyroid disease        Past Surgical History:   Procedure Laterality Date    COLONOSCOPY  2012    COLONOSCOPY  08/23/2017    polyp    UPPER GASTROINTESTINAL ENDOSCOPY  11/06/2017    WISDOM TOOTH EXTRACTION  2015     Most Recent Immunizations   Administered Date(s) Administered    Influenza, Intradermal, Quadrivalent, Preservative Free 11/17/2017    Tdap (Boostrix, Adacel) 06/30/2017    Zoster Live (Zostavax) 08/14/2015        Current Outpatient Prescriptions   Medication Sig Dispense Refill    levothyroxine (SYNTHROID) 75 MCG tablet Take 1 tablet by mouth Daily 90 tablet 1    guaiFENesin-dextromethorphan (ROBITUSSIN DM) 100-10 MG/5ML syrup Take 5 mLs by mouth 3 times daily as needed for Cough 354 mL 0    metoprolol tartrate (LOPRESSOR) 25 MG tablet Take 0.5 tablets by mouth 2 times daily 60 tablet 3    sucralfate (CARAFATE) 1 GM reactive to light. Conjunctivae and EOM are normal.   Cardiovascular: Normal rate and normal heart sounds. Irregularly irregular   Pulmonary/Chest: Effort normal and breath sounds normal. No respiratory distress. He has no wheezes. Neurological: He is alert and oriented to person, place, and time. Skin: Skin is warm and dry. Psychiatric: He has a normal mood and affect. Vitals reviewed. ASSESSMENT:   Well Adult, See encounter diagnoses  Sarah Gordon was seen today for chest congestion and irregular heart beat. Diagnoses and all orders for this visit:    Irregular heart rate  -     EKG 12 Lead  -     TSH without Reflex    Viral URI  Fluids and rest rtc if symptoms worsen or fail to improve  -     guaiFENesin-dextromethorphan (ROBITUSSIN DM) 100-10 MG/5ML syrup; Take 5 mLs by mouth 3 times daily as needed for Cough    Hypothyroidism, unspecified type  Will recheck TSH to ensure it is in normal range. -     levothyroxine (SYNTHROID) 75 MCG tablet; Take 1 tablet by mouth Daily  -     TSH without Reflex    New onset a-fib (HCC)  Chadvasc score 1  1 score is low-moderate risk and should consider antiplatelet or anticoagulation  Advised to start 81 mg aspirin daily  Will start lopressor 12.5mg BID. appt made with Dr. Alek Smart for tomorrow 8/15/18  Reviewed red flags and when to go to the ER  -     ECHO Complete 2D W Doppler W Color; Future  -     metoprolol tartrate (LOPRESSOR) 25 MG tablet; Take 0.5 tablets by mouth 2 times daily  -     Glorious Mohs, MD          Plan:   See orders and medications filed with this encounter. Return in about 4 weeks (around 9/11/2018), or if symptoms worsen or fail to improve.

## 2018-08-15 ENCOUNTER — OFFICE VISIT (OUTPATIENT)
Dept: CARDIOLOGY CLINIC | Age: 63
End: 2018-08-15

## 2018-08-15 ENCOUNTER — TELEPHONE (OUTPATIENT)
Dept: CARDIOLOGY CLINIC | Age: 63
End: 2018-08-15

## 2018-08-15 VITALS
WEIGHT: 222.4 LBS | DIASTOLIC BLOOD PRESSURE: 80 MMHG | BODY MASS INDEX: 30.12 KG/M2 | OXYGEN SATURATION: 96 % | SYSTOLIC BLOOD PRESSURE: 110 MMHG | HEIGHT: 72 IN | HEART RATE: 102 BPM

## 2018-08-15 DIAGNOSIS — I48.91 ATRIAL FIBRILLATION, UNSPECIFIED TYPE (HCC): ICD-10-CM

## 2018-08-15 PROCEDURE — 3017F COLORECTAL CA SCREEN DOC REV: CPT | Performed by: INTERNAL MEDICINE

## 2018-08-15 PROCEDURE — G8427 DOCREV CUR MEDS BY ELIG CLIN: HCPCS | Performed by: INTERNAL MEDICINE

## 2018-08-15 PROCEDURE — 1036F TOBACCO NON-USER: CPT | Performed by: INTERNAL MEDICINE

## 2018-08-15 PROCEDURE — G8417 CALC BMI ABV UP PARAM F/U: HCPCS | Performed by: INTERNAL MEDICINE

## 2018-08-15 PROCEDURE — 99204 OFFICE O/P NEW MOD 45 MIN: CPT | Performed by: INTERNAL MEDICINE

## 2018-08-15 NOTE — PROGRESS NOTES
[address] [facility]   Cardiovascular Evaluation    PATIENT: Ruperto Worrell  DATE: 8/15/2018  MRN: [id number]  CSN: [id number]  : 1955      Primary Care Doctor: Dorian Rooney MD  Reason for evaluation:   New Patient (referred by Dr. Karina El for new onset AF) and Other (denies CP,sob,palpitations,dizziness, fatigue and edema)      Subjective:   History of present illness on initial date of evaluation:   Ruperto Worrell is a 58 y.o. patient who presents at the referral pf Dr. Karina El for new onset atrial fibrillation. He is asymptomatic with this. His heart rate today is 102 bpm. He was prescribed Lopressor 25 mg but has not yet started taking it. He states his pharmacy was out of it. Denies chest pain, shortness of breath, edema or syncope. Patient Active Problem List   Diagnosis    Hypothyroidism    Gastroesophageal reflux disease    Sleep apnea         Past Medical History:   has a past medical history of Chronic kidney disease; Rosacea; Sleep apnea; and Thyroid disease. Surgical History:   has a past surgical history that includes Colonoscopy (); [place] tooth extraction (); Colonoscopy (2017); and Upper gastrointestinal endoscopy (2017). Social History:   reports that he quit smoking about 9 years ago. His smoking use included Cigarettes. He has a 38.00 pack-year smoking history. He has quit using smokeless tobacco. He reports that he drinks about 1.2 - 1.8 oz of alcohol per week . Family History:  No evidence for sudden cardiac death or premature CAD    Home Medications:  Reviewed and are listed in nursing record.  and/or listed below  Current Outpatient Prescriptions   Medication Sig Dispense Refill    levothyroxine (SYNTHROID) 75 MCG tablet Take 1 tablet by mouth Daily 90 tablet 1    guaiFENesin-dextromethorphan (ROBITUSSIN DM) 100-10 MG/5ML syrup Take 5 mLs by mouth 3 times daily as needed for Cough 354 mL 0    metoprolol tartrate (LOPRESSOR) 25 MG tablet Take 0.5 tablets by mouth 2 times daily 60 tablet 3    sucralfate (CARAFATE) 1 GM tablet Take 1 g by mouth 3 times daily (before meals)       omeprazole (PRILOSEC) 40 MG delayed release capsule Take 40 mg by mouth 2 times daily       fluticasone (FLONASE) 50 MCG/ACT nasal spray 1 spray by Nasal route daily      Azelaic Acid (FINACEA) 15 % GEL Apply topically See Admin Instructions       No current facility-administered medications for this visit. Allergies:  Latex and Sulfa antibiotics     Review of Systems:   A 14 point review of symptoms completed. Pertinent positives identified in the HPI, all other review of symptoms negative as below.     Objective:   PHYSICAL EXAM:    Vitals:    08/15/18 1012   BP: 110/80   Pulse: 102   SpO2: 96%    Weight: 222 lb 6.4 oz (100.9 kg)     Wt Readings from Last 3 Encounters:   08/15/18 222 lb 6.4 oz (100.9 kg)   08/14/18 222 lb (100.7 kg)   05/10/18 222 lb 3.2 oz (100.8 kg)       General Appearance:  Alert, cooperative, no distress, appears stated age   Head:  Normocephalic, atraumatic   Eyes:  PERRL, conjunctiva/corneas clear   Nose: Nares normal, no drainage or sinus tenderness   Throat: Lips, mucosa, and tongue normal   Neck: Supple, symmetrical, trachea midline, NL thyroid no carotid bruit or JVD   Lungs:   CTAB, respirations unlabored   Chest Wall:  No tenderness or deformity   Heart:  Irregular and tachy; S1, S2 are normal;   no murmur noted; no rub or gallop   Abdomen:   Soft, non-tender, +BS x 4, no masses, no organomegaly   Extremities: Extremities normal, atraumatic, no cyanosis or edema   Pulses: 2+ and symmetric   Skin: Skin color, texture, turgor normal, no rashes or lesions   Pysch: Normal mood and affect   Neurologic: Normal gross motor and sensory exam.         LABS   CBC:      Lab Results   Component Value Date    WBC 6.7 06/30/2017    RBC 4.51 06/30/2017    HGB 14.6 06/30/2017    HCT 44.0 06/30/2017    MCV 97.6 06/30/2017    RDW 14.4 06/30/2017  2017     CMP:  Lab Results   Component Value Date     2017    K 5.2 2017     2017    CO2 27 2017    BUN 19 2017    CREATININE 1.2 2017    GFRAA >60 2017    AGRATIO 2.3 2017    LABGLOM >60 2017    GLUCOSE 112 2017    PROT 6.2 2017    CALCIUM 10.0 2017    BILITOT 0.3 2017    ALKPHOS 50 2017    AST 21 2017    ALT 29 2017     PT/INR:   No results found for: PTINR  Liver:  No components found for: CHLPL  Lab Results   Component Value Date    ALT 29 2017    AST 21 2017    ALKPHOS 50 2017    BILITOT 0.3 2017     Lab Results   Component Value Date    LABA1C 5.3 2018     Lipids:         Lab Results   Component Value Date    TRIG 113 02/15/2016            Lab Results   Component Value Date    HDL 52 02/15/2016            Lab Results   Component Value Date    LDLCALC 108 (H) 02/15/2016            Lab Results   Component Value Date    LABVLDL 23 02/15/2016         CARDIAC DATA   EK2018 afib at rate 109, no ischemia, possible LVH    ECHO/MUGA:    STRESS TEST:    CARDIAC CATH:    VASCULAR/OTHER IMAGING:      Assessment and Plan   Humaira Flynn is a 58 y.o. male who presents today for the following problems:      1. afib with RVR: suspect new onset   - no CP   - CHADS-VASC: 0 (currently)    PLAN  1. TSH normal  2. Agree with TTE  3. If normal will get MONICA and cardioversion with anesthesia   - will start Eliquis 5mg po BID now in preperation   - will need NOAC 1 month prior to following cardioversion  4. RN visit in 2 wks for HR check and ECG   - goal resting HR <85           Patient Active Problem List   Diagnosis    Hypothyroidism    Gastroesophageal reflux disease    Sleep apnea         Plan:  1. Start taking Metoprolol (Lopressor) 25 mg daily  2. Echocardiogram  3. MONICA/Cardioversion is recommended in approximately 1 month   4.  Start taking Eliquis 5 mg twice daily

## 2018-08-16 ENCOUNTER — TELEPHONE (OUTPATIENT)
Dept: CARDIOLOGY CLINIC | Age: 63
End: 2018-08-16

## 2018-08-16 NOTE — TELEPHONE ENCOUNTER
Spoke with pt. Instructed him to remain NPO at midnight the night prior to the procedure. Advised him to take all routine medications with sips of water the morning of the procedure. Also reminded him to not apply creams or lotions the morning of. Pt verbalized understanding.

## 2018-08-16 NOTE — TELEPHONE ENCOUNTER
Spoke with patient. Patient is scheduled with Dr. Alberto Nelson for MONICA/Cardioversion w anesthesia on 9/17/18 at [facility], arrival time of 8:30am to the Cath Lab. Please have patient arrive to the main entrance of [facility] at 8:15am and check in with the registration desk. Please call patient regarding medication instructions. Remind patient to be NPO after midnight (8 hours prior). Do not apply lotions/creams on skin the day of procedure. I will also mail.

## 2018-08-17 ENCOUNTER — HOSPITAL ENCOUNTER (OUTPATIENT)
Dept: CARDIOLOGY | Age: 63
Discharge: HOME | End: 2018-08-17
Payer: COMMERCIAL

## 2018-08-17 LAB
LV EF: 55 %
LVEF MODALITY: NORMAL

## 2018-08-17 PROCEDURE — 93306 TTE W/DOPPLER COMPLETE: CPT

## 2018-08-23 ENCOUNTER — TELEPHONE (OUTPATIENT)
Dept: CARDIOLOGY CLINIC | Age: 63
End: 2018-08-23

## 2018-08-23 DIAGNOSIS — I48.91 ATRIAL FIBRILLATION, UNSPECIFIED TYPE (HCC): Primary | ICD-10-CM

## 2018-08-24 NOTE — TELEPHONE ENCOUNTER
Patient came in for EKG per JJP. RKG viewed EKG and said it's ok. Reminder- pt is leaving on vacation tomorrow, any changes?

## 2018-08-29 NOTE — TELEPHONE ENCOUNTER
Yes continue meds for now.  Will need to place a 30day monitor to eval for occult afib, if negative then can drop eliquis for ASA 325mg day

## 2018-09-11 ENCOUNTER — TELEPHONE (OUTPATIENT)
Dept: CARDIOLOGY CLINIC | Age: 63
End: 2018-09-11

## 2018-10-04 ENCOUNTER — TELEPHONE (OUTPATIENT)
Dept: CARDIOLOGY CLINIC | Age: 63
End: 2018-10-04

## 2018-10-04 PROCEDURE — 93228 REMOTE 30 DAY ECG REV/REPORT: CPT | Performed by: INTERNAL MEDICINE

## 2018-10-08 NOTE — TELEPHONE ENCOUNTER
LM for patient to call and schedule.  Pt can be added on as new patient to rps's schedule on 10/10 @ 12:45 per Yaw Acosta

## 2018-10-10 ENCOUNTER — TELEPHONE (OUTPATIENT)
Dept: CARDIOLOGY CLINIC | Age: 63
End: 2018-10-10

## 2018-10-10 ENCOUNTER — OFFICE VISIT (OUTPATIENT)
Dept: CARDIOLOGY CLINIC | Age: 63
End: 2018-10-10
Payer: COMMERCIAL

## 2018-10-10 VITALS
SYSTOLIC BLOOD PRESSURE: 104 MMHG | BODY MASS INDEX: 30.75 KG/M2 | DIASTOLIC BLOOD PRESSURE: 82 MMHG | HEIGHT: 72 IN | WEIGHT: 227 LBS | HEART RATE: 66 BPM

## 2018-10-10 DIAGNOSIS — I48.91 NEW ONSET A-FIB (HCC): ICD-10-CM

## 2018-10-10 DIAGNOSIS — I48.91 ATRIAL FIBRILLATION, UNSPECIFIED TYPE (HCC): ICD-10-CM

## 2018-10-10 DIAGNOSIS — I48.91 ATRIAL FIBRILLATION, UNSPECIFIED TYPE (HCC): Primary | ICD-10-CM

## 2018-10-10 PROCEDURE — G8417 CALC BMI ABV UP PARAM F/U: HCPCS | Performed by: INTERNAL MEDICINE

## 2018-10-10 PROCEDURE — G8427 DOCREV CUR MEDS BY ELIG CLIN: HCPCS | Performed by: INTERNAL MEDICINE

## 2018-10-10 PROCEDURE — G8484 FLU IMMUNIZE NO ADMIN: HCPCS | Performed by: INTERNAL MEDICINE

## 2018-10-10 PROCEDURE — 99244 OFF/OP CNSLTJ NEW/EST MOD 40: CPT | Performed by: INTERNAL MEDICINE

## 2018-10-10 PROCEDURE — 3017F COLORECTAL CA SCREEN DOC REV: CPT | Performed by: INTERNAL MEDICINE

## 2018-10-10 PROCEDURE — 93000 ELECTROCARDIOGRAM COMPLETE: CPT | Performed by: INTERNAL MEDICINE

## 2018-10-10 RX ORDER — ASPIRIN 81 MG/1
81 TABLET ORAL DAILY
Qty: 30 TABLET | Refills: 5 | Status: SHIPPED | OUTPATIENT
Start: 2018-10-10 | End: 2019-07-08

## 2018-10-10 NOTE — PATIENT INSTRUCTIONS
RECOMMENDATIONS:  1. Increase Metoprolol 25 mg twice daily. 2. Stop Eliquis as your [id number] vasc score is 0.   3. Start Aspirin 81 mg daily. 4. Follow up with Harvey Bosworth CNP in 3 months and then 6 months after that.

## 2018-10-10 NOTE — LETTER
[address] [place] [address] Cardiology - [address] [place], [place] [address]Augustin Andrews  Phone: [phone]  Fax: [phone]    Ming Chavez MD        October 10, 2018     [place] [address] Mitzi Andrews    Patient: Edwige June  MR Number: [id number]  YOB: 1955  Date of Visit: 10/10/2018    Dear Dr. Barber Saleh:     Below are the relevant portions of my assessment and plan of care. [address]   Electrophysiology Consult Note              Date:  October 10, 2018  Patient name: Edwige June  YOB: 1955    Reason for Consult: New Patient, Atrial Fibrillation    Requesting Physician: Dr. Stefani Robledoet: Edwige June is a 58 y.o. male who presents for evaluation for Paroxysmal atrial fibrillation. He was found to be in new onset afib 8/2018 by Barber Saleh MD. He then followed up with Dr. Rosa Mc an wore a cardiac event monitor that confirmed PAF. His echo from 8/17/18 showed an EF of 55% and a moderately enlarged left atrial size. His EKG from today shows sinus rhythm rate 66. He reports he was fairly asymptomatic with his episodes of afib. He has occasional chest discomfort which he attributes to GERD. This occurs after he eats. He has followed up with Dr. Ravin Lockwood for this. Past Medical History:   has a past medical history of Chronic kidney disease; Rosacea; Sleep apnea; and Thyroid disease. Past Surgical History:   has a past surgical history that includes Colonoscopy (2012); [place] tooth extraction (2015); Colonoscopy (08/23/2017); and Upper gastrointestinal endoscopy (11/06/2017). Allergies:  Latex and Sulfa antibiotics    Social History:   reports that he quit smoking about 9 years ago. His smoking use included Cigarettes. He has a 38.00 pack-year smoking history. He has quit using smokeless tobacco. He reports that he drinks about 1.2 - 1.8 oz of alcohol per week . · The carotid upstroke is normal in amplitude and contour without delay or bruit  · Peripheral pulses are symmetrical and full  Abdomen:  · No masses or tenderness  · Bowel sounds present  Extremities:  ·  No Cyanosis or Clubbing  ·  Lower extremity edema: No  · Skin: Warm and dry  Neurological:  · Alert and oriented. · Moves all extremities well  · No abnormalities of mood, affect, memory, mentation, or behavior are noted      DATA:    ECG: SR 66  ECHO: 8/2018   Conclusions      Summary   Normal left ventricle systolic function with an estimated ejection fraction   of 55%. No regional wall motion abnormalities are seen. Normal left ventricular diastolic filling pressure. The right ventricle appears mildly dilated with normal function. Mild bi-atrial enlargement. Mild mitral and tricuspid regurgitation. Systolic pulmonary artery pressure (SPAP) is normal and estimated at 26 mmHg   (right atrial pressure 3 mmHg). LV Diastolic Dimension: 1.51 cm LV Systolic Dimension: 2.94 cm   LV Septum Diastolic: 0.8 cm   LV PW Diastolic: 0.9 cm         AO Root Dimension: 3.2 cm                                   AV Cusp Separation: 2 cm                                   LA Dimension: 3.9 cm                                   LA Area: 26.9 cm2                                   LA volume/Index: 84.75 ml /38 ml/m2       [id number] Score for Atrial Fibrillation Stroke Risk   Risk   Factors  Component Value   C CHF No 0   H HTN No 0   A2 Age >= 75 No,  (61 y.o.) 0   D DM No 0   S2 Prior Stroke/TIA No 0   V Vascular Disease No 0   A Age 74-69 No,  (61 y.o.) 0   Sc Sex male 0    [id number]  Score  0   Score last updated 49/65/14 0:22 PM    Click here for a link to the UpToDate guideline \"Atrial Fibrillation: Anticoagulation therapy to prevent embolization    Disclaimer: Risk Score calculation is dependent on accuracy of patient problem list and past encounter diagnosis.      IMPRESSION: 1. Paroxysmal atrial fibrillation          RECOMMENDATIONS:  1. Increase Metoprolol 25 mg twice daily. 2. Stop Eliquis as your [id number] vasc score is 0.   3. Start Aspirin 81 mg daily. 4. Follow up with Jerson Gannon CNP in 3 months and then 6 months after that. QUALITY MEASURES  1. Tobacco Cessation Counseling: NA  2. Retake of BP if >140/90:   NA  3. Documentation to PCP/referring for new patient:  Sent to PCP at close of office visit  4. CAD patient on anti-platelet: NA  5. CAD patient on STATIN therapy:  NA  6. Patient aFib on anticoagulation:  Yes, ASA      I, Milena Cuello RN, scribed this note under the direct supervision of Dr. Danae Colon. Magda Rivera on 10/10/2018. The scribes docuementation has been prepared under my direction and personally reviewed by me in its entirety. I confirm that the note above accurately reflects all work, treatment, procedures, and medical decision making performed by me. All questions and concerns were addressed to the patient/family. Alternatives to my treatment were discussed. LEESA CallejasC. Electrophysiology  [facility]. [address] [facility]. [address]. [facility], [address]  Phone: [phone]  Fax: [phone]                 If you have questions, please do not hesitate to call me. I look forward to following Leonel Chilel along with you.     Sincerely,        Melisa Milton MD

## 2018-10-10 NOTE — PROGRESS NOTES
MCG tablet Take 1 tablet by mouth Daily 8/14/18  Yes Barber Saleh MD   metoprolol tartrate (LOPRESSOR) 25 MG tablet Take 0.5 tablets by mouth 2 times daily 8/14/18  Yes Barber Saleh MD   sucralfate (CARAFATE) 1 GM tablet Take 1 g by mouth 3 times daily (before meals)  4/23/18  Yes Historical Provider, MD   omeprazole (PRILOSEC) 40 MG delayed release capsule Take 40 mg by mouth 2 times daily    Yes Historical Provider, MD   fluticasone (FLONASE) 50 MCG/ACT nasal spray 1 spray by Nasal route daily   Yes Historical Provider, MD   Azelaic Acid (FINACEA) 15 % GEL Apply topically See Admin Instructions   Yes Historical Provider, MD          REVIEW OF SYSTEMS:      All 14-point review of systems are completed and  pertinent positives are mentioned in the history of present illness. Other  systems are reviewed and are negative. Physical Examination:    /82   Pulse 66   Ht 6' (1.829 m)   Wt 227 lb (103 kg)   BMI 30.79 kg/m²      Constitutional and General Appearance:    alert, cooperative, no distress and appears stated age  [de-identified]:    PERRLA, no cervical lymphadenopathy. No masses palpable. Normal oral mucosa  Respiratory:  · Normal excursion and expansion without use of accessory muscles  · Resp Auscultation: Normal breath sounds without dullness or wheezing  Cardiovascular:  · The apical impulse is not displaced  · Heart tones are crisp and normal. regular S1 and S2.  · Jugular venous pulsation Normal  · The carotid upstroke is normal in amplitude and contour without delay or bruit  · Peripheral pulses are symmetrical and full  Abdomen:  · No masses or tenderness  · Bowel sounds present  Extremities:  ·  No Cyanosis or Clubbing  ·  Lower extremity edema: No  · Skin: Warm and dry  Neurological:  · Alert and oriented.   · Moves all extremities well  · No abnormalities of mood, affect, memory, mentation, or behavior are noted      DATA:    ECG: SR 66  ECHO: 8/2018   Conclusions      Summary   Normal left ventricle systolic function with an estimated ejection fraction   of 55%. No regional wall motion abnormalities are seen. Normal left ventricular diastolic filling pressure. The right ventricle appears mildly dilated with normal function. Mild bi-atrial enlargement. Mild mitral and tricuspid regurgitation. Systolic pulmonary artery pressure (SPAP) is normal and estimated at 26 mmHg   (right atrial pressure 3 mmHg). LV Diastolic Dimension: 3.72 cm LV Systolic Dimension: 9.53 cm   LV Septum Diastolic: 0.8 cm   LV PW Diastolic: 0.9 cm         AO Root Dimension: 3.2 cm                                   AV Cusp Separation: 2 cm                                   LA Dimension: 3.9 cm                                   LA Area: 26.9 cm2                                   LA volume/Index: 84.75 ml /38 ml/m2       [id number] Score for Atrial Fibrillation Stroke Risk   Risk   Factors  Component Value   C CHF No 0   H HTN No 0   A2 Age >= 75 No,  (61 y.o.) 0   D DM No 0   S2 Prior Stroke/TIA No 0   V Vascular Disease No 0   A Age 74-69 No,  (61 y.o.) 0   Sc Sex male 0    [id number]  Score  0   Score last updated 74/12/98 9:57 PM    Click here for a link to the UpToDate guideline \"Atrial Fibrillation: Anticoagulation therapy to prevent embolization    Disclaimer: Risk Score calculation is dependent on accuracy of patient problem list and past encounter diagnosis. IMPRESSION:    1. Paroxysmal atrial fibrillation          RECOMMENDATIONS:  1. Increase Metoprolol 25 mg twice daily. 2. Stop Eliquis as your [id number] vasc score is 0.   3. Start Aspirin 81 mg daily. 4. Follow up with Ziggy Muhammad CNP in 3 months and then 6 months after that. QUALITY MEASURES  1. Tobacco Cessation Counseling: NA  2. Retake of BP if >140/90:   NA  3. Documentation to PCP/referring for new patient:  Sent to PCP at close of office visit  4. CAD patient on anti-platelet: NA  5. CAD patient on STATIN therapy:  NA  6. Patient aFib on anticoagulation:  Yes, ASA      I, Jamal Garcia RN, scribed this note under the direct supervision of Dr. Mynor Matias. Jessica Bucio on 10/10/2018. The scribes docuementation has been prepared under my direction and personally reviewed by me in its entirety. I confirm that the note above accurately reflects all work, treatment, procedures, and medical decision making performed by me. All questions and concerns were addressed to the patient/family. Alternatives to my treatment were discussed. ALYCIA Ledbetter F.A.C.C. [facility]  [facility]. [address] [facility]. [address].   [facility] [address]  Phone: [phone]  Fax: [phone]

## 2018-11-09 ENCOUNTER — OFFICE VISIT (OUTPATIENT)
Dept: FAMILY MEDICINE CLINIC | Age: 63
End: 2018-11-09
Payer: COMMERCIAL

## 2018-11-09 VITALS
OXYGEN SATURATION: 98 % | WEIGHT: 233 LBS | BODY MASS INDEX: 31.6 KG/M2 | SYSTOLIC BLOOD PRESSURE: 134 MMHG | HEART RATE: 62 BPM | DIASTOLIC BLOOD PRESSURE: 84 MMHG

## 2018-11-09 DIAGNOSIS — E03.9 HYPOTHYROIDISM, UNSPECIFIED TYPE: ICD-10-CM

## 2018-11-09 DIAGNOSIS — R20.2 NUMBNESS AND TINGLING OF LEFT HAND: ICD-10-CM

## 2018-11-09 DIAGNOSIS — Z99.89 OSA ON CPAP: ICD-10-CM

## 2018-11-09 DIAGNOSIS — E55.9 VITAMIN D DEFICIENCY: ICD-10-CM

## 2018-11-09 DIAGNOSIS — I48.91 ATRIAL FIBRILLATION, UNSPECIFIED TYPE (HCC): Primary | ICD-10-CM

## 2018-11-09 DIAGNOSIS — G47.33 OSA ON CPAP: ICD-10-CM

## 2018-11-09 DIAGNOSIS — Z13.220 LIPID SCREENING: ICD-10-CM

## 2018-11-09 DIAGNOSIS — R20.0 NUMBNESS AND TINGLING OF LEFT HAND: ICD-10-CM

## 2018-11-09 LAB
A/G RATIO: 2.4 (ref 1.1–2.2)
ALBUMIN SERPL-MCNC: 4.5 G/DL (ref 3.4–5)
ALP BLD-CCNC: 45 U/L (ref 40–129)
ALT SERPL-CCNC: 30 U/L (ref 10–40)
ANION GAP SERPL CALCULATED.3IONS-SCNC: 12 MMOL/L (ref 3–16)
AST SERPL-CCNC: 22 U/L (ref 15–37)
BILIRUB SERPL-MCNC: <0.2 MG/DL (ref 0–1)
BUN BLDV-MCNC: 19 MG/DL (ref 7–20)
CALCIUM SERPL-MCNC: 10.2 MG/DL (ref 8.3–10.6)
CHLORIDE BLD-SCNC: 102 MMOL/L (ref 99–110)
CHOLESTEROL, TOTAL: 208 MG/DL (ref 0–199)
CO2: 26 MMOL/L (ref 21–32)
CREAT SERPL-MCNC: 1.2 MG/DL (ref 0.8–1.3)
GFR AFRICAN AMERICAN: >60
GFR NON-AFRICAN AMERICAN: >60
GLOBULIN: 1.9 G/DL
GLUCOSE BLD-MCNC: 105 MG/DL (ref 70–99)
HDLC SERPL-MCNC: 48 MG/DL (ref 40–60)
LDL CHOLESTEROL CALCULATED: 143 MG/DL
POTASSIUM SERPL-SCNC: 5.4 MMOL/L (ref 3.5–5.1)
SODIUM BLD-SCNC: 140 MMOL/L (ref 136–145)
TOTAL PROTEIN: 6.4 G/DL (ref 6.4–8.2)
TRIGL SERPL-MCNC: 85 MG/DL (ref 0–150)
VITAMIN D 25-HYDROXY: 76.1 NG/ML
VLDLC SERPL CALC-MCNC: 17 MG/DL

## 2018-11-09 PROCEDURE — 3017F COLORECTAL CA SCREEN DOC REV: CPT | Performed by: FAMILY MEDICINE

## 2018-11-09 PROCEDURE — 99214 OFFICE O/P EST MOD 30 MIN: CPT | Performed by: FAMILY MEDICINE

## 2018-11-09 PROCEDURE — 36415 COLL VENOUS BLD VENIPUNCTURE: CPT | Performed by: FAMILY MEDICINE

## 2018-11-09 PROCEDURE — G8427 DOCREV CUR MEDS BY ELIG CLIN: HCPCS | Performed by: FAMILY MEDICINE

## 2018-11-09 PROCEDURE — 1036F TOBACCO NON-USER: CPT | Performed by: FAMILY MEDICINE

## 2018-11-09 PROCEDURE — G8417 CALC BMI ABV UP PARAM F/U: HCPCS | Performed by: FAMILY MEDICINE

## 2018-11-09 PROCEDURE — G8484 FLU IMMUNIZE NO ADMIN: HCPCS | Performed by: FAMILY MEDICINE

## 2018-11-09 RX ORDER — MINOCYCLINE HYDROCHLORIDE 100 MG/1
CAPSULE ORAL
COMMUNITY
Start: 2018-09-01 | End: 2019-05-10 | Stop reason: SDUPTHER

## 2018-11-09 ASSESSMENT — PATIENT HEALTH QUESTIONNAIRE - PHQ9
2. FEELING DOWN, DEPRESSED OR HOPELESS: 0
1. LITTLE INTEREST OR PLEASURE IN DOING THINGS: 0
SUM OF ALL RESPONSES TO PHQ QUESTIONS 1-9: 0
SUM OF ALL RESPONSES TO PHQ QUESTIONS 1-9: 0
SUM OF ALL RESPONSES TO PHQ9 QUESTIONS 1 & 2: 0

## 2018-11-11 ASSESSMENT — ENCOUNTER SYMPTOMS: SHORTNESS OF BREATH: 0

## 2018-11-12 DIAGNOSIS — E87.5 HYPERKALEMIA: Primary | ICD-10-CM

## 2018-11-12 RX ORDER — ROSUVASTATIN CALCIUM 5 MG/1
5 TABLET, COATED ORAL DAILY
Qty: 30 TABLET | Refills: 5 | Status: SHIPPED | OUTPATIENT
Start: 2018-11-12 | End: 2018-12-13 | Stop reason: SDUPTHER

## 2018-11-16 ENCOUNTER — NURSE ONLY (OUTPATIENT)
Dept: FAMILY MEDICINE CLINIC | Age: 63
End: 2018-11-16

## 2018-11-16 DIAGNOSIS — E87.5 HYPERKALEMIA: Primary | ICD-10-CM

## 2018-11-16 DIAGNOSIS — E87.5 HYPERKALEMIA: ICD-10-CM

## 2018-11-16 LAB — POTASSIUM SERPL-SCNC: 4.6 MMOL/L (ref 3.5–5.1)

## 2018-11-16 PROCEDURE — 36415 COLL VENOUS BLD VENIPUNCTURE: CPT | Performed by: FAMILY MEDICINE

## 2018-11-20 ENCOUNTER — HOSPITAL ENCOUNTER (OUTPATIENT)
Dept: GENERAL RADIOLOGY | Age: 63
End: 2018-11-20
Payer: COMMERCIAL

## 2018-11-20 ENCOUNTER — HOSPITAL ENCOUNTER (OUTPATIENT)
Dept: GENERAL RADIOLOGY | Age: 63
Discharge: HOME | End: 2018-11-20
Payer: COMMERCIAL

## 2018-11-20 DIAGNOSIS — K21.9 GASTROESOPHAGEAL REFLUX DISEASE, ESOPHAGITIS PRESENCE NOT SPECIFIED: ICD-10-CM

## 2018-11-20 DIAGNOSIS — R07.89 OTHER CHEST PAIN: ICD-10-CM

## 2018-11-20 PROCEDURE — 74220 X-RAY XM ESOPHAGUS 1CNTRST: CPT

## 2018-11-21 ENCOUNTER — OFFICE VISIT (OUTPATIENT)
Dept: ORTHOPEDIC SURGERY | Age: 63
End: 2018-11-21
Payer: COMMERCIAL

## 2018-11-21 VITALS — BODY MASS INDEX: 31.56 KG/M2 | WEIGHT: 233.03 LBS | HEIGHT: 72 IN

## 2018-11-21 DIAGNOSIS — R20.2 PARESTHESIA OF BOTH HANDS: ICD-10-CM

## 2018-11-21 DIAGNOSIS — M79.642 HAND PAIN, LEFT: Primary | ICD-10-CM

## 2018-11-21 PROCEDURE — 3017F COLORECTAL CA SCREEN DOC REV: CPT | Performed by: ORTHOPAEDIC SURGERY

## 2018-11-21 PROCEDURE — G8417 CALC BMI ABV UP PARAM F/U: HCPCS | Performed by: ORTHOPAEDIC SURGERY

## 2018-11-21 PROCEDURE — G8484 FLU IMMUNIZE NO ADMIN: HCPCS | Performed by: ORTHOPAEDIC SURGERY

## 2018-11-21 PROCEDURE — G8427 DOCREV CUR MEDS BY ELIG CLIN: HCPCS | Performed by: ORTHOPAEDIC SURGERY

## 2018-11-21 PROCEDURE — 99243 OFF/OP CNSLTJ NEW/EST LOW 30: CPT | Performed by: ORTHOPAEDIC SURGERY

## 2018-11-21 NOTE — PROGRESS NOTES
Chief Complaint  Hand Pain (Left hand tingling )      HISTORY OF PRESENT ILLNESS:  Ihsan Villagomez is a  right-hand-dominant patient here for a numbness & tingling in ulnar fingers of his Left hand that began approximately 10 years ago, if not longer. Since then symptoms have been persistent. Patient does have associated hand weakness. Symptoms are worsening over time. EMG testing: no.     Patient denies neck pain, no troubles with his balance.     A specialty hand and upper extremity consultation was requested by my local colleague Dr. Josep Garcia, for evaluation and treatment of left hand numbness    Medical History:  Past Medical History:   Diagnosis Date    Chronic kidney disease     childhood nephritis    Rosacea     Sleep apnea     compliant with cpap    Thyroid disease      Past Surgical History:   Procedure Laterality Date    COLONOSCOPY  2012    COLONOSCOPY  08/23/2017    polyp    UPPER GASTROINTESTINAL ENDOSCOPY  11/06/2017    WISDOM TOOTH EXTRACTION  2015     Family History   Problem Relation Age of Onset    Heart Disease Mother     High Blood Pressure Mother     Arthritis Mother     Cancer Father 66        prostate    Alcohol Abuse Brother      Social History     Social History    Marital status:      Spouse name: N/A    Number of children: N/A    Years of education: N/A     Social History Main Topics    Smoking status: Former Smoker     Packs/day: 1.00     Years: 38.00     Types: Cigarettes     Quit date: 1/1/2009    Smokeless tobacco: Former User    Alcohol use 1.2 - 1.8 oz/week     2 - 3 Shots of liquor per week      Comment: 2-3 night    Drug use: Unknown    Sexual activity: Not Asked     Other Topics Concern    None     Social History Narrative    None     Current Outpatient Prescriptions   Medication Sig Dispense Refill    rosuvastatin (CRESTOR) 5 MG tablet Take 1 tablet by mouth daily 30 tablet 5    minocycline (MINOCIN;DYNACIN) 100 MG capsule      

## 2018-12-06 ENCOUNTER — OFFICE VISIT (OUTPATIENT)
Dept: ORTHOPEDIC SURGERY | Age: 63
End: 2018-12-06
Payer: COMMERCIAL

## 2018-12-06 DIAGNOSIS — G56.22 ULNAR NEUROPATHY OF LEFT UPPER EXTREMITY: Primary | ICD-10-CM

## 2018-12-06 PROCEDURE — 95908 NRV CNDJ TST 3-4 STUDIES: CPT | Performed by: PHYSICAL MEDICINE & REHABILITATION

## 2018-12-06 PROCEDURE — 95886 MUSC TEST DONE W/N TEST COMP: CPT | Performed by: PHYSICAL MEDICINE & REHABILITATION

## 2018-12-12 ENCOUNTER — TELEPHONE (OUTPATIENT)
Dept: CARDIOLOGY CLINIC | Age: 63
End: 2018-12-12

## 2018-12-12 ENCOUNTER — OFFICE VISIT (OUTPATIENT)
Dept: ORTHOPEDIC SURGERY | Age: 63
End: 2018-12-12
Payer: COMMERCIAL

## 2018-12-12 VITALS — BODY MASS INDEX: 31.56 KG/M2 | HEIGHT: 72 IN | WEIGHT: 233.03 LBS

## 2018-12-12 DIAGNOSIS — G56.22 ULNAR NEUROPATHY OF LEFT UPPER EXTREMITY: Primary | ICD-10-CM

## 2018-12-12 DIAGNOSIS — R20.2 PARESTHESIA OF BOTH HANDS: ICD-10-CM

## 2018-12-12 PROCEDURE — G8484 FLU IMMUNIZE NO ADMIN: HCPCS | Performed by: ORTHOPAEDIC SURGERY

## 2018-12-12 PROCEDURE — 99213 OFFICE O/P EST LOW 20 MIN: CPT | Performed by: ORTHOPAEDIC SURGERY

## 2018-12-12 PROCEDURE — 1036F TOBACCO NON-USER: CPT | Performed by: ORTHOPAEDIC SURGERY

## 2018-12-12 PROCEDURE — G8417 CALC BMI ABV UP PARAM F/U: HCPCS | Performed by: ORTHOPAEDIC SURGERY

## 2018-12-12 PROCEDURE — 3017F COLORECTAL CA SCREEN DOC REV: CPT | Performed by: ORTHOPAEDIC SURGERY

## 2018-12-12 PROCEDURE — G8427 DOCREV CUR MEDS BY ELIG CLIN: HCPCS | Performed by: ORTHOPAEDIC SURGERY

## 2018-12-12 RX ORDER — PANTOPRAZOLE SODIUM 40 MG/1
TABLET, DELAYED RELEASE ORAL
COMMUNITY
Start: 2018-11-28 | End: 2019-11-08

## 2018-12-12 NOTE — PROGRESS NOTES
Chief Complaint   Patient presents with    Follow-up     EMG TEST RESULT RUPERTO        HISTORY OF PRESENT ILLNESS:  Clotilde Magallon is a 58 y.o.  patient here for repeat evaluation after undergoing EMG testing for suspected Left sided cubital tunnel syndrome. Numbness is still persistent small and ring finger. He feels some  weakness    ROS:  ROS neg     Past medical history, medications, and allergies are reviewed again today. There are no changes to report. PHYSICAL EXAMINATION:  Patient is alert and pleasant, in no acute distress. The affected extremity is examined once again today. Left elbow reveals nice alignment. Good motion without ulnar nerve instability. No atrophy of the hand. Patient has an old PIP joint contracture small finger. Negative Wartenberg. Decreased light touch and sensation ring and small finger. Positive Tinel cubital tunnel    X-rays:  None needed today. EMG of the Left Upper Extremity is reviewed, impression:    [address] MEDICINE      Patient:            Clotilde Magallon                 Age:           58 Years 3 Months  Sex:                  Male                                   Date:          12/6/2018  YOB: 1955                           Ref.Phys.:   [id number] [facility]  Notes:  left hand numbness; drinks 1/2 pint of bourbon per night      Sensory NCS      Nerve / Sites Peak PeakAmp Dist Duane     ms µV cm m/s   L MEDIAN - D2 ULNAR D5   1. Median Wrist 3.45 26.8 14 54.9   2. Ulnar Wrist 3.65 19.0 14 50.0   L MEDIAN - RADIAL THUMB   1. Median Wrist           2. Radial Wrist 2.35 26.9 10 55.6       Motor NCS      Nerve / Sites Lat Amp Amp Dist Duane     ms mV % cm m/s   L MEDIAN - APB   1. Wrist 3.50 11.0 100 8     2. Elbow 8.45 10.6 96.6 29 58.6   L ULNAR - ADM   1. Wrist 3.05 7.2 100 8     2. B. Elbow 7.35 6.5 90.1 23 53.5   3. A. Elbow 9.65 6.2 85.6 10 43.5                   EMG Summary Table       Spontaneous

## 2018-12-12 NOTE — LETTER
[address] [place] [address] - [address] [place] [address]  Phone: [phone]  Fax: [phone]    Floyd Marinelli MD        December 13, 2018     Mitch Galdamez [address] [place] [address] . ahe male 1955 is Ok to hold Aspirin for 7 days and proceed without further testing. If you have any questions or concerns, please don't hesitate to call.     Sincerely,        Floyd Marinelli MD

## 2018-12-13 ENCOUNTER — OFFICE VISIT (OUTPATIENT)
Dept: FAMILY MEDICINE CLINIC | Age: 63
End: 2018-12-13
Payer: COMMERCIAL

## 2018-12-13 VITALS
WEIGHT: 239 LBS | BODY MASS INDEX: 32.41 KG/M2 | DIASTOLIC BLOOD PRESSURE: 78 MMHG | OXYGEN SATURATION: 98 % | HEART RATE: 78 BPM | SYSTOLIC BLOOD PRESSURE: 132 MMHG

## 2018-12-13 DIAGNOSIS — G56.22 CUBITAL TUNNEL SYNDROME ON LEFT: ICD-10-CM

## 2018-12-13 DIAGNOSIS — E78.5 HYPERLIPIDEMIA, UNSPECIFIED HYPERLIPIDEMIA TYPE: ICD-10-CM

## 2018-12-13 DIAGNOSIS — Z01.818 PRE-OP EXAMINATION: Primary | ICD-10-CM

## 2018-12-13 LAB
A/G RATIO: 2.6 (ref 1.1–2.2)
ALBUMIN SERPL-MCNC: 4.7 G/DL (ref 3.4–5)
ALP BLD-CCNC: 70 U/L (ref 40–129)
ALT SERPL-CCNC: 37 U/L (ref 10–40)
ANION GAP SERPL CALCULATED.3IONS-SCNC: 11 MMOL/L (ref 3–16)
AST SERPL-CCNC: 26 U/L (ref 15–37)
BASOPHILS ABSOLUTE: 0 K/UL (ref 0–0.2)
BASOPHILS RELATIVE PERCENT: 0.5 %
BILIRUB SERPL-MCNC: 0.3 MG/DL (ref 0–1)
BUN BLDV-MCNC: 21 MG/DL (ref 7–20)
CALCIUM SERPL-MCNC: 10.1 MG/DL (ref 8.3–10.6)
CHLORIDE BLD-SCNC: 102 MMOL/L (ref 99–110)
CO2: 27 MMOL/L (ref 21–32)
CREAT SERPL-MCNC: 1.3 MG/DL (ref 0.8–1.3)
EOSINOPHILS ABSOLUTE: 0.4 K/UL (ref 0–0.6)
EOSINOPHILS RELATIVE PERCENT: 6.2 %
GFR AFRICAN AMERICAN: >60
GFR NON-AFRICAN AMERICAN: 56
GLOBULIN: 1.8 G/DL
GLUCOSE BLD-MCNC: 95 MG/DL (ref 70–99)
HCT VFR BLD CALC: 41.6 % (ref 40.5–52.5)
HEMOGLOBIN: 14.2 G/DL (ref 13.5–17.5)
LYMPHOCYTES ABSOLUTE: 1.6 K/UL (ref 1–5.1)
LYMPHOCYTES RELATIVE PERCENT: 23.9 %
MCH RBC QN AUTO: 33 PG (ref 26–34)
MCHC RBC AUTO-ENTMCNC: 34.1 G/DL (ref 31–36)
MCV RBC AUTO: 96.8 FL (ref 80–100)
MONOCYTES ABSOLUTE: 0.6 K/UL (ref 0–1.3)
MONOCYTES RELATIVE PERCENT: 9.6 %
NEUTROPHILS ABSOLUTE: 4 K/UL (ref 1.7–7.7)
NEUTROPHILS RELATIVE PERCENT: 59.8 %
PDW BLD-RTO: 13.4 % (ref 12.4–15.4)
PLATELET # BLD: 215 K/UL (ref 135–450)
PMV BLD AUTO: 9.3 FL (ref 5–10.5)
POTASSIUM SERPL-SCNC: 4.9 MMOL/L (ref 3.5–5.1)
RBC # BLD: 4.3 M/UL (ref 4.2–5.9)
SODIUM BLD-SCNC: 140 MMOL/L (ref 136–145)
TOTAL PROTEIN: 6.5 G/DL (ref 6.4–8.2)
WBC # BLD: 6.7 K/UL (ref 4–11)

## 2018-12-13 PROCEDURE — 36415 COLL VENOUS BLD VENIPUNCTURE: CPT | Performed by: NURSE PRACTITIONER

## 2018-12-13 PROCEDURE — G8427 DOCREV CUR MEDS BY ELIG CLIN: HCPCS | Performed by: NURSE PRACTITIONER

## 2018-12-13 PROCEDURE — 3017F COLORECTAL CA SCREEN DOC REV: CPT | Performed by: NURSE PRACTITIONER

## 2018-12-13 PROCEDURE — 99242 OFF/OP CONSLTJ NEW/EST SF 20: CPT | Performed by: NURSE PRACTITIONER

## 2018-12-13 PROCEDURE — G8484 FLU IMMUNIZE NO ADMIN: HCPCS | Performed by: NURSE PRACTITIONER

## 2018-12-13 PROCEDURE — G8417 CALC BMI ABV UP PARAM F/U: HCPCS | Performed by: NURSE PRACTITIONER

## 2018-12-13 RX ORDER — ROSUVASTATIN CALCIUM 5 MG/1
5 TABLET, COATED ORAL DAILY
Qty: 30 TABLET | Refills: 5 | Status: SHIPPED | OUTPATIENT
Start: 2018-12-13 | End: 2019-06-07 | Stop reason: SDUPTHER

## 2018-12-13 NOTE — PROGRESS NOTES
[facility]  [phone]  Fax: [phone]   Pre-operative History and Physical      DIAGNOSIS:  Neuropathy of the left upper extremity, left cubital tunnel syndrome    PROCEDURE:  Cubital tunnel release       History Obtained From:  patient    HISTORY OF PRESENT ILLNESS:    The patient is a 58 y.o. male with significant past medical history of left numbness and tingling for 20 years who presents today for a pre-op exam.        Past Medical History:   Diagnosis Date    Arthritis     Chronic kidney disease     childhood nephritis    Rosacea     Sleep apnea     compliant with cpap    Thyroid disease      Past Surgical History:   Procedure Laterality Date    COLONOSCOPY  2012    COLONOSCOPY  08/23/2017    polyp    MOUTH SURGERY      UPPER GASTROINTESTINAL ENDOSCOPY  11/06/2017    VASECTOMY      WISDOM TOOTH EXTRACTION  2015     Current Outpatient Prescriptions   Medication Sig Dispense Refill    pantoprazole (PROTONIX) 40 MG tablet       rosuvastatin (CRESTOR) 5 MG tablet Take 1 tablet by mouth daily 30 tablet 5    minocycline (MINOCIN;DYNACIN) 100 MG capsule       metoprolol tartrate (LOPRESSOR) 25 MG tablet Take 1 tablet by mouth 2 times daily 60 tablet 3    aspirin EC 81 MG EC tablet Take 1 tablet by mouth daily 30 tablet 5    levothyroxine (SYNTHROID) 75 MCG tablet Take 1 tablet by mouth Daily 90 tablet 1    sucralfate (CARAFATE) 1 GM tablet Take 1 g by mouth 3 times daily (before meals)       omeprazole (PRILOSEC) 40 MG delayed release capsule Take 40 mg by mouth 2 times daily       fluticasone (FLONASE) 50 MCG/ACT nasal spray 1 spray by Nasal route daily      Azelaic Acid (FINACEA) 15 % GEL Apply topically See Admin Instructions       No current facility-administered medications for this visit.           Allergies:  Latex and Sulfa antibiotics  History of allergic reaction to anesthesia:  No     History   Smoking Status    Former Smoker    Packs/day: 1.00    Years: 38.00    Types: Cigarettes    Quit date: 1/1/2009   Smokeless Tobacco    Former User     The patient states he drinks bourbon daily. Family History   Problem Relation Age of Onset    Heart Disease Mother     High Blood Pressure Mother     Arthritis Mother     Cancer Father 66        prostate    Alcohol Abuse Brother        REVIEW OF SYSTEMS:    CONSTITUTIONAL:  positive for  negative  EYES:  positive for  glasses and dry eyes  HEENT:  positive for  hearing loss and snoring  RESPIRATORY:  positive for  cpap  CARDIOVASCULAR:  positive for  edema  GASTROINTESTINAL:  negative  GENITOURINARY:  negative  INTEGUMENT/BREAST:  positive for rosacea on face  HEMATOLOGIC/LYMPHATIC:  positive for easy bruising  ALLERGIC/IMMUNOLOGIC:  positive for drug reactions  ENDOCRINE:  positive for hypothyroidism  MUSCULOSKELETAL:  positive for  myalgias, arthralgias and stiff joints  NEUROLOGICAL:  negative    PHYSICAL EXAM:      /78   Pulse 78   Wt 239 lb (108.4 kg)   SpO2 98%   BMI 32.41 kg/m²     CONSTITUTIONAL:  awake, alert, cooperative, no apparent distress, and appears stated age    Eyes:  Lids and lashes normal, pupils equal, round and reactive to light, extra ocular muscles intact, sclera clear, conjunctiva normal    Head/ENT:  Normocephalic, without obvious abnormality, atramatic, sinuses nontender on palpation, external ears without lesions, oral pharynx with moist mucus membranes, tonsils without erythema or exudates, gums normal and good dentition.     Neck:  Supple, symmetrical, trachea midline, no adenopathy, thyroid symmetric, not enlarged and no tenderness, skin normal, No carotid bruit    Heart:  Normal apical impulse, regular rate and rhythm, normal S1 and S2, no S3 or S4, and no murmur noted    Lungs:  No increased work of breathing, good air exchange, clear to auscultation bilaterally, no crackles or wheezing    Abdomen:  No scars, normal bowel sounds, soft, non-distended, non-tender, no masses palpated, no hepatosplenomegally    Extremities:  No clubbing, cyanosis, or edema    NEUROLOGIC:  Awake, alert, oriented to name, place and time. Cranial nerves II-XII are grossly intact. Motor is 5 out of 5 bilaterally. DATA:  EKG:  Date:  10/10/18 with Dr. Danae Cassidy  I have reviewed EKG with the following interpretation:  Impression:  WNL  Dr. Danae Cassidy provided clearance via phone encounter on 12/13/18    Labs to be drawn today- CBC and CMP    ASSESSMENT AND PLAN:    1. Patient is a 58 y.o. male with above specified procedure planned on 12/24/18 with Dr. Larry Koenig at [facility].     2. Stop NSAIDs and aspirin medications 7 days prior to procedure.   3.Patient is cleared for surgery    [address]  [phone]

## 2018-12-14 ENCOUNTER — TELEPHONE (OUTPATIENT)
Dept: ORTHOPEDIC SURGERY | Age: 63
End: 2018-12-14

## 2018-12-24 ENCOUNTER — ANESTHESIA EVENT (OUTPATIENT)
Dept: OPERATING ROOM | Age: 63
End: 2018-12-24
Payer: COMMERCIAL

## 2018-12-24 ENCOUNTER — HOSPITAL ENCOUNTER (OUTPATIENT)
Age: 63
Setting detail: OUTPATIENT SURGERY
Discharge: HOME | End: 2018-12-24
Attending: ORTHOPAEDIC SURGERY | Admitting: ORTHOPAEDIC SURGERY
Payer: COMMERCIAL

## 2018-12-24 ENCOUNTER — ANESTHESIA (OUTPATIENT)
Dept: OPERATING ROOM | Age: 63
End: 2018-12-24
Payer: COMMERCIAL

## 2018-12-24 VITALS
SYSTOLIC BLOOD PRESSURE: 133 MMHG | TEMPERATURE: 98 F | WEIGHT: 230 LBS | DIASTOLIC BLOOD PRESSURE: 78 MMHG | HEIGHT: 72 IN | OXYGEN SATURATION: 100 % | BODY MASS INDEX: 31.15 KG/M2 | HEART RATE: 84 BPM | RESPIRATION RATE: 16 BRPM

## 2018-12-24 VITALS
OXYGEN SATURATION: 100 % | DIASTOLIC BLOOD PRESSURE: 54 MMHG | SYSTOLIC BLOOD PRESSURE: 92 MMHG | RESPIRATION RATE: 7 BRPM

## 2018-12-24 DIAGNOSIS — G56.22 CUBITAL TUNNEL SYNDROME ON LEFT: Primary | ICD-10-CM

## 2018-12-24 PROCEDURE — 3600000002 HC SURGERY LEVEL 2 BASE: Performed by: ORTHOPAEDIC SURGERY

## 2018-12-24 PROCEDURE — 6360000002 HC RX W HCPCS: Performed by: ORTHOPAEDIC SURGERY

## 2018-12-24 PROCEDURE — 6360000002 HC RX W HCPCS: Performed by: NURSE ANESTHETIST, CERTIFIED REGISTERED

## 2018-12-24 PROCEDURE — 2500000003 HC RX 250 WO HCPCS: Performed by: ORTHOPAEDIC SURGERY

## 2018-12-24 PROCEDURE — 3700000001 HC ADD 15 MINUTES (ANESTHESIA): Performed by: ORTHOPAEDIC SURGERY

## 2018-12-24 PROCEDURE — 3600000012 HC SURGERY LEVEL 2 ADDTL 15MIN: Performed by: ORTHOPAEDIC SURGERY

## 2018-12-24 PROCEDURE — 2500000003 HC RX 250 WO HCPCS: Performed by: ANESTHESIOLOGY

## 2018-12-24 PROCEDURE — 2500000003 HC RX 250 WO HCPCS: Performed by: NURSE ANESTHETIST, CERTIFIED REGISTERED

## 2018-12-24 PROCEDURE — 2580000003 HC RX 258: Performed by: ANESTHESIOLOGY

## 2018-12-24 PROCEDURE — 7100000010 HC PHASE II RECOVERY - FIRST 15 MIN: Performed by: ORTHOPAEDIC SURGERY

## 2018-12-24 PROCEDURE — 2709999900 HC NON-CHARGEABLE SUPPLY: Performed by: ORTHOPAEDIC SURGERY

## 2018-12-24 PROCEDURE — 7100000000 HC PACU RECOVERY - FIRST 15 MIN: Performed by: ORTHOPAEDIC SURGERY

## 2018-12-24 PROCEDURE — 7100000011 HC PHASE II RECOVERY - ADDTL 15 MIN: Performed by: ORTHOPAEDIC SURGERY

## 2018-12-24 PROCEDURE — 7100000001 HC PACU RECOVERY - ADDTL 15 MIN: Performed by: ORTHOPAEDIC SURGERY

## 2018-12-24 PROCEDURE — 3700000000 HC ANESTHESIA ATTENDED CARE: Performed by: ORTHOPAEDIC SURGERY

## 2018-12-24 RX ORDER — MORPHINE SULFATE 10 MG/ML
2 INJECTION, SOLUTION INTRAMUSCULAR; INTRAVENOUS EVERY 5 MIN PRN
Status: DISCONTINUED | OUTPATIENT
Start: 2018-12-24 | End: 2018-12-24 | Stop reason: HOSPADM

## 2018-12-24 RX ORDER — OXYCODONE HYDROCHLORIDE AND ACETAMINOPHEN 5; 325 MG/1; MG/1
2 TABLET ORAL PRN
Status: DISCONTINUED | OUTPATIENT
Start: 2018-12-24 | End: 2018-12-24 | Stop reason: HOSPADM

## 2018-12-24 RX ORDER — DIPHENHYDRAMINE HYDROCHLORIDE 50 MG/ML
12.5 INJECTION INTRAMUSCULAR; INTRAVENOUS
Status: DISCONTINUED | OUTPATIENT
Start: 2018-12-24 | End: 2018-12-24 | Stop reason: HOSPADM

## 2018-12-24 RX ORDER — LIDOCAINE HYDROCHLORIDE 10 MG/ML
INJECTION, SOLUTION INFILTRATION; PERINEURAL PRN
Status: DISCONTINUED | OUTPATIENT
Start: 2018-12-24 | End: 2018-12-24 | Stop reason: SDUPTHER

## 2018-12-24 RX ORDER — SODIUM CHLORIDE 0.9 % (FLUSH) 0.9 %
10 SYRINGE (ML) INJECTION EVERY 12 HOURS SCHEDULED
Status: DISCONTINUED | OUTPATIENT
Start: 2018-12-24 | End: 2018-12-24 | Stop reason: HOSPADM

## 2018-12-24 RX ORDER — SODIUM CHLORIDE 0.9 % (FLUSH) 0.9 %
10 SYRINGE (ML) INJECTION PRN
Status: DISCONTINUED | OUTPATIENT
Start: 2018-12-24 | End: 2018-12-24 | Stop reason: HOSPADM

## 2018-12-24 RX ORDER — LIDOCAINE HYDROCHLORIDE 10 MG/ML
0.3 INJECTION, SOLUTION EPIDURAL; INFILTRATION; INTRACAUDAL; PERINEURAL
Status: COMPLETED | OUTPATIENT
Start: 2018-12-24 | End: 2018-12-24

## 2018-12-24 RX ORDER — CEFAZOLIN SODIUM 2 G/50ML
2 SOLUTION INTRAVENOUS ONCE
Status: COMPLETED | OUTPATIENT
Start: 2018-12-24 | End: 2018-12-24

## 2018-12-24 RX ORDER — PROPOFOL 10 MG/ML
INJECTION, EMULSION INTRAVENOUS PRN
Status: DISCONTINUED | OUTPATIENT
Start: 2018-12-24 | End: 2018-12-24 | Stop reason: SDUPTHER

## 2018-12-24 RX ORDER — PROMETHAZINE HYDROCHLORIDE 25 MG/ML
6.25 INJECTION, SOLUTION INTRAMUSCULAR; INTRAVENOUS
Status: DISCONTINUED | OUTPATIENT
Start: 2018-12-24 | End: 2018-12-24 | Stop reason: HOSPADM

## 2018-12-24 RX ORDER — BUPIVACAINE HYDROCHLORIDE 2.5 MG/ML
INJECTION, SOLUTION INFILTRATION; PERINEURAL PRN
Status: DISCONTINUED | OUTPATIENT
Start: 2018-12-24 | End: 2018-12-24 | Stop reason: HOSPADM

## 2018-12-24 RX ORDER — LABETALOL HYDROCHLORIDE 5 MG/ML
5 INJECTION, SOLUTION INTRAVENOUS EVERY 10 MIN PRN
Status: DISCONTINUED | OUTPATIENT
Start: 2018-12-24 | End: 2018-12-24 | Stop reason: HOSPADM

## 2018-12-24 RX ORDER — GLYCOPYRROLATE 0.2 MG/ML
INJECTION INTRAMUSCULAR; INTRAVENOUS PRN
Status: DISCONTINUED | OUTPATIENT
Start: 2018-12-24 | End: 2018-12-24 | Stop reason: SDUPTHER

## 2018-12-24 RX ORDER — SODIUM CHLORIDE, SODIUM LACTATE, POTASSIUM CHLORIDE, CALCIUM CHLORIDE 600; 310; 30; 20 MG/100ML; MG/100ML; MG/100ML; MG/100ML
INJECTION, SOLUTION INTRAVENOUS CONTINUOUS
Status: DISCONTINUED | OUTPATIENT
Start: 2018-12-24 | End: 2018-12-24 | Stop reason: HOSPADM

## 2018-12-24 RX ORDER — MIDAZOLAM HYDROCHLORIDE 1 MG/ML
INJECTION INTRAMUSCULAR; INTRAVENOUS PRN
Status: DISCONTINUED | OUTPATIENT
Start: 2018-12-24 | End: 2018-12-24 | Stop reason: SDUPTHER

## 2018-12-24 RX ORDER — HYDROCODONE BITARTRATE AND ACETAMINOPHEN 5; 325 MG/1; MG/1
1 TABLET ORAL EVERY 8 HOURS PRN
Qty: 15 TABLET | Refills: 0 | Status: SHIPPED | OUTPATIENT
Start: 2018-12-24 | End: 2018-12-27

## 2018-12-24 RX ORDER — MEPERIDINE HYDROCHLORIDE 25 MG/ML
12.5 INJECTION INTRAMUSCULAR; INTRAVENOUS; SUBCUTANEOUS EVERY 5 MIN PRN
Status: DISCONTINUED | OUTPATIENT
Start: 2018-12-24 | End: 2018-12-24 | Stop reason: HOSPADM

## 2018-12-24 RX ORDER — OXYCODONE HYDROCHLORIDE AND ACETAMINOPHEN 5; 325 MG/1; MG/1
1 TABLET ORAL PRN
Status: DISCONTINUED | OUTPATIENT
Start: 2018-12-24 | End: 2018-12-24 | Stop reason: HOSPADM

## 2018-12-24 RX ORDER — ONDANSETRON 2 MG/ML
4 INJECTION INTRAMUSCULAR; INTRAVENOUS PRN
Status: DISCONTINUED | OUTPATIENT
Start: 2018-12-24 | End: 2018-12-24 | Stop reason: HOSPADM

## 2018-12-24 RX ORDER — DEXAMETHASONE SODIUM PHOSPHATE 4 MG/ML
INJECTION, SOLUTION INTRA-ARTICULAR; INTRALESIONAL; INTRAMUSCULAR; INTRAVENOUS; SOFT TISSUE PRN
Status: DISCONTINUED | OUTPATIENT
Start: 2018-12-24 | End: 2018-12-24 | Stop reason: SDUPTHER

## 2018-12-24 RX ORDER — ONDANSETRON 2 MG/ML
INJECTION INTRAMUSCULAR; INTRAVENOUS PRN
Status: DISCONTINUED | OUTPATIENT
Start: 2018-12-24 | End: 2018-12-24 | Stop reason: SDUPTHER

## 2018-12-24 RX ORDER — MORPHINE SULFATE 10 MG/ML
1 INJECTION, SOLUTION INTRAMUSCULAR; INTRAVENOUS EVERY 5 MIN PRN
Status: DISCONTINUED | OUTPATIENT
Start: 2018-12-24 | End: 2018-12-24 | Stop reason: HOSPADM

## 2018-12-24 RX ORDER — HYDRALAZINE HYDROCHLORIDE 20 MG/ML
5 INJECTION INTRAMUSCULAR; INTRAVENOUS EVERY 10 MIN PRN
Status: DISCONTINUED | OUTPATIENT
Start: 2018-12-24 | End: 2018-12-24 | Stop reason: HOSPADM

## 2018-12-24 RX ADMIN — PROPOFOL 200 MG: 10 INJECTION, EMULSION INTRAVENOUS at 08:37

## 2018-12-24 RX ADMIN — CEFAZOLIN SODIUM 2 G: 2 SOLUTION INTRAVENOUS at 08:40

## 2018-12-24 RX ADMIN — LIDOCAINE HYDROCHLORIDE 0.1 ML: 10 INJECTION, SOLUTION EPIDURAL; INFILTRATION; INTRACAUDAL; PERINEURAL at 07:45

## 2018-12-24 RX ADMIN — MIDAZOLAM 2 MG: 1 INJECTION INTRAMUSCULAR; INTRAVENOUS at 08:35

## 2018-12-24 RX ADMIN — LIDOCAINE HYDROCHLORIDE 40 MG: 10 INJECTION, SOLUTION INFILTRATION; PERINEURAL at 08:37

## 2018-12-24 RX ADMIN — ONDANSETRON 4 MG: 2 INJECTION, SOLUTION INTRAMUSCULAR; INTRAVENOUS at 08:45

## 2018-12-24 RX ADMIN — CEFAZOLIN SODIUM 2 G: 2 SOLUTION INTRAVENOUS at 08:36

## 2018-12-24 RX ADMIN — GLYCOPYRROLATE 0.2 MG: 0.2 INJECTION, SOLUTION INTRAMUSCULAR; INTRAVENOUS at 09:08

## 2018-12-24 RX ADMIN — SODIUM CHLORIDE, POTASSIUM CHLORIDE, SODIUM LACTATE AND CALCIUM CHLORIDE: 600; 310; 30; 20 INJECTION, SOLUTION INTRAVENOUS at 07:45

## 2018-12-24 RX ADMIN — DEXAMETHASONE SODIUM PHOSPHATE 4 MG: 4 INJECTION, SOLUTION INTRAMUSCULAR; INTRAVENOUS at 08:45

## 2018-12-24 ASSESSMENT — PAIN DESCRIPTION - DESCRIPTORS: DESCRIPTORS: PINS AND NEEDLES

## 2018-12-24 ASSESSMENT — PULMONARY FUNCTION TESTS
PIF_VALUE: 15
PIF_VALUE: 9
PIF_VALUE: 4
PIF_VALUE: 0
PIF_VALUE: 1
PIF_VALUE: 11
PIF_VALUE: 10
PIF_VALUE: 10
PIF_VALUE: 0
PIF_VALUE: 8
PIF_VALUE: 10
PIF_VALUE: 9
PIF_VALUE: 9
PIF_VALUE: 8
PIF_VALUE: 9
PIF_VALUE: 10
PIF_VALUE: 9
PIF_VALUE: 7
PIF_VALUE: 10
PIF_VALUE: 10
PIF_VALUE: 9
PIF_VALUE: 8
PIF_VALUE: 10
PIF_VALUE: 9
PIF_VALUE: 1
PIF_VALUE: 2
PIF_VALUE: 11
PIF_VALUE: 10
PIF_VALUE: 10
PIF_VALUE: 3
PIF_VALUE: 10
PIF_VALUE: 9
PIF_VALUE: 8
PIF_VALUE: 8
PIF_VALUE: 9
PIF_VALUE: 10
PIF_VALUE: 0
PIF_VALUE: 9
PIF_VALUE: 10
PIF_VALUE: 10
PIF_VALUE: 8
PIF_VALUE: 0
PIF_VALUE: 1
PIF_VALUE: 10
PIF_VALUE: 4
PIF_VALUE: 10

## 2018-12-24 ASSESSMENT — PAIN SCALES - GENERAL
PAINLEVEL_OUTOF10: 0
PAINLEVEL_OUTOF10: 0

## 2018-12-24 ASSESSMENT — PAIN - FUNCTIONAL ASSESSMENT: PAIN_FUNCTIONAL_ASSESSMENT: 0-10

## 2019-01-04 ENCOUNTER — OFFICE VISIT (OUTPATIENT)
Dept: ORTHOPEDIC SURGERY | Age: 64
End: 2019-01-04
Payer: COMMERCIAL

## 2019-01-04 VITALS — WEIGHT: 229.94 LBS | HEIGHT: 72 IN | BODY MASS INDEX: 31.14 KG/M2

## 2019-01-04 DIAGNOSIS — G56.22 CUBITAL TUNNEL SYNDROME ON LEFT: Primary | ICD-10-CM

## 2019-01-04 PROCEDURE — 99024 POSTOP FOLLOW-UP VISIT: CPT | Performed by: ORTHOPAEDIC SURGERY

## 2019-01-04 PROCEDURE — E0191 PROTECTOR HEEL OR ELBOW: HCPCS | Performed by: ORTHOPAEDIC SURGERY

## 2019-01-21 DIAGNOSIS — E03.9 HYPOTHYROIDISM, UNSPECIFIED TYPE: ICD-10-CM

## 2019-01-21 RX ORDER — LEVOTHYROXINE SODIUM 0.07 MG/1
75 TABLET ORAL DAILY
Qty: 90 TABLET | Refills: 1 | Status: SHIPPED | OUTPATIENT
Start: 2019-01-21 | End: 2019-07-23 | Stop reason: SDUPTHER

## 2019-01-24 ENCOUNTER — OFFICE VISIT (OUTPATIENT)
Dept: CARDIOLOGY CLINIC | Age: 64
End: 2019-01-24
Payer: COMMERCIAL

## 2019-01-24 VITALS
HEIGHT: 73 IN | DIASTOLIC BLOOD PRESSURE: 80 MMHG | HEART RATE: 62 BPM | SYSTOLIC BLOOD PRESSURE: 130 MMHG | WEIGHT: 239.8 LBS | BODY MASS INDEX: 31.78 KG/M2 | OXYGEN SATURATION: 98 %

## 2019-01-24 DIAGNOSIS — I48.0 PAROXYSMAL ATRIAL FIBRILLATION (HCC): ICD-10-CM

## 2019-01-24 PROCEDURE — G8427 DOCREV CUR MEDS BY ELIG CLIN: HCPCS | Performed by: NURSE PRACTITIONER

## 2019-01-24 PROCEDURE — 3017F COLORECTAL CA SCREEN DOC REV: CPT | Performed by: NURSE PRACTITIONER

## 2019-01-24 PROCEDURE — G8417 CALC BMI ABV UP PARAM F/U: HCPCS | Performed by: NURSE PRACTITIONER

## 2019-01-24 PROCEDURE — G8484 FLU IMMUNIZE NO ADMIN: HCPCS | Performed by: NURSE PRACTITIONER

## 2019-01-24 PROCEDURE — 99213 OFFICE O/P EST LOW 20 MIN: CPT | Performed by: NURSE PRACTITIONER

## 2019-01-24 PROCEDURE — 1036F TOBACCO NON-USER: CPT | Performed by: NURSE PRACTITIONER

## 2019-01-24 PROCEDURE — 93000 ELECTROCARDIOGRAM COMPLETE: CPT | Performed by: NURSE PRACTITIONER

## 2019-02-13 ENCOUNTER — OFFICE VISIT (OUTPATIENT)
Dept: ORTHOPEDIC SURGERY | Age: 64
End: 2019-02-13

## 2019-02-13 VITALS — BODY MASS INDEX: 31.79 KG/M2 | HEIGHT: 73 IN | WEIGHT: 239.86 LBS

## 2019-02-13 DIAGNOSIS — G56.22 CUBITAL TUNNEL SYNDROME ON LEFT: Primary | ICD-10-CM

## 2019-02-13 PROCEDURE — 99024 POSTOP FOLLOW-UP VISIT: CPT | Performed by: ORTHOPAEDIC SURGERY

## 2019-02-25 DIAGNOSIS — I48.91 NEW ONSET A-FIB (HCC): ICD-10-CM

## 2019-05-10 ENCOUNTER — OFFICE VISIT (OUTPATIENT)
Dept: FAMILY MEDICINE CLINIC | Age: 64
End: 2019-05-10
Payer: COMMERCIAL

## 2019-05-10 VITALS
BODY MASS INDEX: 30.48 KG/M2 | RESPIRATION RATE: 16 BRPM | DIASTOLIC BLOOD PRESSURE: 62 MMHG | WEIGHT: 230 LBS | HEART RATE: 63 BPM | HEIGHT: 73 IN | OXYGEN SATURATION: 98 % | SYSTOLIC BLOOD PRESSURE: 130 MMHG

## 2019-05-10 DIAGNOSIS — L71.9 ROSACEA: Primary | ICD-10-CM

## 2019-05-10 DIAGNOSIS — E78.5 HYPERLIPIDEMIA, UNSPECIFIED HYPERLIPIDEMIA TYPE: ICD-10-CM

## 2019-05-10 DIAGNOSIS — I48.91 ATRIAL FIBRILLATION, UNSPECIFIED TYPE (HCC): ICD-10-CM

## 2019-05-10 DIAGNOSIS — E03.9 HYPOTHYROIDISM, UNSPECIFIED TYPE: ICD-10-CM

## 2019-05-10 DIAGNOSIS — Z87.891 PERSONAL HISTORY OF TOBACCO USE: ICD-10-CM

## 2019-05-10 PROCEDURE — 3017F COLORECTAL CA SCREEN DOC REV: CPT | Performed by: FAMILY MEDICINE

## 2019-05-10 PROCEDURE — G0296 VISIT TO DETERM LDCT ELIG: HCPCS | Performed by: FAMILY MEDICINE

## 2019-05-10 PROCEDURE — 1036F TOBACCO NON-USER: CPT | Performed by: FAMILY MEDICINE

## 2019-05-10 PROCEDURE — G8417 CALC BMI ABV UP PARAM F/U: HCPCS | Performed by: FAMILY MEDICINE

## 2019-05-10 PROCEDURE — G8427 DOCREV CUR MEDS BY ELIG CLIN: HCPCS | Performed by: FAMILY MEDICINE

## 2019-05-10 PROCEDURE — 99214 OFFICE O/P EST MOD 30 MIN: CPT | Performed by: FAMILY MEDICINE

## 2019-05-10 RX ORDER — AZELAIC ACID 0.15 G/G
1 GEL TOPICAL SEE ADMIN INSTRUCTIONS
Qty: 1 TUBE | Refills: 1 | Status: SHIPPED | OUTPATIENT
Start: 2019-05-10 | End: 2020-11-06 | Stop reason: SDUPTHER

## 2019-05-10 RX ORDER — MINOCYCLINE HYDROCHLORIDE 100 MG/1
100 CAPSULE ORAL DAILY
Qty: 90 CAPSULE | Refills: 1 | Status: SHIPPED | OUTPATIENT
Start: 2019-05-10 | End: 2019-09-30 | Stop reason: SDUPTHER

## 2019-05-10 ASSESSMENT — PATIENT HEALTH QUESTIONNAIRE - PHQ9
1. LITTLE INTEREST OR PLEASURE IN DOING THINGS: 0
2. FEELING DOWN, DEPRESSED OR HOPELESS: 0
SUM OF ALL RESPONSES TO PHQ9 QUESTIONS 1 & 2: 0
SUM OF ALL RESPONSES TO PHQ QUESTIONS 1-9: 0
SUM OF ALL RESPONSES TO PHQ QUESTIONS 1-9: 0

## 2019-05-10 ASSESSMENT — ENCOUNTER SYMPTOMS: SHORTNESS OF BREATH: 0

## 2019-05-10 NOTE — PROGRESS NOTES
Chief Complaint   Patient presents with    6 Month Follow-Up     hyperlipidemia, Hypothyroidism         HPI      58 y.o. male presents today for follow up. He is feeling well and has no acute complaints. Hx of Hypothyroidism. Reports compliance with medications. Last TSH 3.41 8/2018. Hx of atrial fibrillation follows with cardiology. Reports compliance with lopressor 25mg BID. BP today 130/62  Hx of REINIER compliant with cpap. Follows with sleep medicine  Hx of GERD on prilosec and crafate. Follows with Dr. Renetta Cheng in GI. Hx of Rosacea on minocycline and azelaic acid doing well. Needs refill. Hx of HLD on crestor 5mg reports compliance without medication SE.    Patient Active Problem List   Diagnosis    Hypothyroidism    Gastroesophageal reflux disease    Sleep apnea    Paroxysmal atrial fibrillation ([facility] [address]     Past Medical History:   Diagnosis Date    Arthritis     Chronic kidney disease     childhood nephritis    Rosacea     Sleep apnea     compliant with cpap    Thyroid disease        Past Surgical History:   Procedure Laterality Date    COLONOSCOPY  2012    COLONOSCOPY  08/23/2017    polyp    ELBOW SURGERY Left     MOUTH SURGERY      [place] DECOMPRESS FOREARM,EXCIS MUSC/NERV Left 12/24/2018    LEFT CUBITAL TUNNEL RELEASE performed by Kaley Paulson MD at [address]  11/06/2017    VASECTOMY      WISDOM TOOTH EXTRACTION  2015     Most Recent Immunizations   Administered Date(s) Administered    Influenza Virus Vaccine 10/06/2018    Influenza, Intradermal, Quadrivalent, Preservative Free 11/17/2017    Influenza, Quadv, 3 yrs and older, IM, PF (Fluzone 3 yrs and older or Afluria 5 yrs and older) 10/06/2018    Tdap (Boostrix, Adacel) 06/30/2017    Zoster Live (Zostavax) 08/14/2015        Current Outpatient Medications   Medication Sig Dispense Refill    minocycline (MINOCIN;DYNACIN) 100 MG capsule Take 1 capsule by mouth daily 90 capsule 1    Azelaic Acid 15 % GEL Apply 1 drop topically See Admin Instructions 1 Tube 1    metoprolol tartrate (LOPRESSOR) 25 MG tablet Take 1 tablet by mouth 2 times daily 60 tablet 11    levothyroxine (SYNTHROID) 75 MCG tablet Take 1 tablet by mouth Daily 90 tablet 1    rosuvastatin (CRESTOR) 5 MG tablet Take 1 tablet by mouth daily 30 tablet 5    pantoprazole (PROTONIX) 40 MG tablet       aspirin EC 81 MG EC tablet Take 1 tablet by mouth daily 30 tablet 5    sucralfate (CARAFATE) 1 GM tablet Take 1 g by mouth 3 times daily (before meals)       fluticasone (FLONASE) 50 MCG/ACT nasal spray 1 spray by Nasal route daily       No current facility-administered medications for this visit. Allergies   Allergen Reactions    Latex Dermatitis    Sulfa Antibiotics Rash       Social History     Socioeconomic History    Marital status:      Spouse name: None    Number of children: None    Years of education: None    Highest education level: None   Occupational History    None   Social Needs    Financial resource strain: None    Food insecurity:     Worry: None     Inability: None    Transportation needs:     Medical: None     Non-medical: None   Tobacco Use    Smoking status: Former Smoker     Packs/day: 1.00     Years: 38.00     Pack years: 38.00     Types: Cigarettes     Last attempt to quit: 1/1/2009     Years since quitting: 10.3    Smokeless tobacco: Former User   Substance and Sexual Activity    Alcohol use:  Yes     Alcohol/week: 1.2 - 1.8 oz     Types: 2 - 3 Shots of liquor per week     Comment: 2-3 night    Drug use: No    Sexual activity: None   Lifestyle    Physical activity:     Days per week: None     Minutes per session: None    Stress: None   Relationships    Social connections:     Talks on phone: None     Gets together: None     Attends Mormon service: None     Active member of club or organization: None     Attends meetings of clubs or organizations: None     Relationship status: None    Intimate partner violence:     Fear of current or ex partner: None     Emotionally abused: None     Physically abused: None     Forced sexual activity: None   Other Topics Concern    None   Social History Narrative    None     Family History   Problem Relation Age of Onset    Heart Disease Mother     High Blood Pressure Mother     Arthritis Mother     Cancer Father 66        prostate    Alcohol Abuse Brother                               Review Of Systems    Review of Systems   Constitutional: Negative for chills and fever. Respiratory: Negative for shortness of breath. Cardiovascular: Negative for chest pain. PHYSICAL EXAMINATION:    /62 (Site: Left Upper Arm, Position: Sitting, Cuff Size: Medium Adult)   Pulse 63   Resp 16   Ht 6' 0.99\" (1.854 m)   Wt 230 lb (104.3 kg)   SpO2 98%   BMI 30.35 kg/m²      Physical Exam   Constitutional: He is oriented to person, place, and time. He appears well-developed and well-nourished. No distress. HENT:   Head: Normocephalic and atraumatic. Right Ear: External ear normal.   Left Ear: External ear normal.   Eyes: Conjunctivae and EOM are normal.   Cardiovascular: Normal rate, regular rhythm and normal heart sounds. Pulmonary/Chest: Effort normal. No stridor. No respiratory distress. He has no wheezes. He has no rales. Neurological: He is alert and oriented to person, place, and time. Skin: Skin is warm and dry. He is not diaphoretic. Psychiatric: He has a normal mood and affect. Vitals reviewed. ASSESSMENT:   Well Adult, See encounter diagnoses  Stewart Rincon was seen today for 6 month follow-up. Diagnoses and all orders for this visit:    Rosacea  -     minocycline (MINOCIN;DYNACIN) 100 MG capsule; Take 1 capsule by mouth daily  -     Azelaic Acid 15 % GEL;  Apply 1 drop topically See Admin Instructions    Personal history of tobacco use  -     CA VISIT TO DISCUSS LUNG CA SCREEN W LDCT  -     CT Lung Screening;

## 2019-05-17 ENCOUNTER — HOSPITAL ENCOUNTER (OUTPATIENT)
Dept: CT IMAGING | Age: 64
Discharge: HOME | End: 2019-05-17
Payer: COMMERCIAL

## 2019-05-17 DIAGNOSIS — Z87.891 PERSONAL HISTORY OF TOBACCO USE: ICD-10-CM

## 2019-05-17 PROCEDURE — G0297 LDCT FOR LUNG CA SCREEN: HCPCS

## 2019-06-07 DIAGNOSIS — E78.5 HYPERLIPIDEMIA, UNSPECIFIED HYPERLIPIDEMIA TYPE: ICD-10-CM

## 2019-06-07 RX ORDER — ROSUVASTATIN CALCIUM 5 MG/1
5 TABLET, COATED ORAL DAILY
Qty: 30 TABLET | Refills: 5 | Status: SHIPPED | OUTPATIENT
Start: 2019-06-07 | End: 2019-07-04

## 2019-06-28 ENCOUNTER — APPOINTMENT (OUTPATIENT)
Dept: CT IMAGING | Age: 64
End: 2019-06-28
Payer: COMMERCIAL

## 2019-06-28 ENCOUNTER — HOSPITAL ENCOUNTER (EMERGENCY)
Age: 64
Discharge: ANOTHER HOSPITAL | End: 2019-06-28
Attending: EMERGENCY MEDICINE
Payer: COMMERCIAL

## 2019-06-28 ENCOUNTER — APPOINTMENT (OUTPATIENT)
Dept: GENERAL RADIOLOGY | Age: 64
End: 2019-06-28
Payer: COMMERCIAL

## 2019-06-28 VITALS
OXYGEN SATURATION: 98 % | WEIGHT: 235 LBS | TEMPERATURE: 97.3 F | DIASTOLIC BLOOD PRESSURE: 87 MMHG | SYSTOLIC BLOOD PRESSURE: 140 MMHG | BODY MASS INDEX: 31.01 KG/M2 | RESPIRATION RATE: 20 BRPM | HEART RATE: 65 BPM

## 2019-06-28 DIAGNOSIS — I63.9 CEREBROVASCULAR ACCIDENT (CVA), UNSPECIFIED MECHANISM (HCC): Primary | ICD-10-CM

## 2019-06-28 LAB
A/G RATIO: 1.8 (ref 1.1–2.2)
ALBUMIN SERPL-MCNC: 4.2 G/DL (ref 3.4–5)
ALP BLD-CCNC: 46 U/L (ref 40–129)
ALT SERPL-CCNC: 43 U/L (ref 10–40)
ANION GAP SERPL CALCULATED.3IONS-SCNC: 12 MMOL/L (ref 3–16)
APTT: 26.1 SEC (ref 26–36)
AST SERPL-CCNC: 27 U/L (ref 15–37)
BASOPHILS ABSOLUTE: 0.1 K/UL (ref 0–0.2)
BASOPHILS RELATIVE PERCENT: 0.7 %
BILIRUB SERPL-MCNC: <0.2 MG/DL (ref 0–1)
BUN BLDV-MCNC: 17 MG/DL (ref 7–20)
CALCIUM SERPL-MCNC: 9.7 MG/DL (ref 8.3–10.6)
CHLORIDE BLD-SCNC: 103 MMOL/L (ref 99–110)
CO2: 25 MMOL/L (ref 21–32)
CREAT SERPL-MCNC: 1.2 MG/DL (ref 0.8–1.3)
EOSINOPHILS ABSOLUTE: 0.3 K/UL (ref 0–0.6)
EOSINOPHILS RELATIVE PERCENT: 3.7 %
GFR AFRICAN AMERICAN: >60
GFR NON-AFRICAN AMERICAN: >60
GLOBULIN: 2.3 G/DL
GLUCOSE BLD-MCNC: 84 MG/DL (ref 70–99)
GLUCOSE BLD-MCNC: 86 MG/DL (ref 70–99)
HCT VFR BLD CALC: 37.4 % (ref 40.5–52.5)
HEMOGLOBIN: 13.2 G/DL (ref 13.5–17.5)
INR BLD: 0.97 (ref 0.86–1.14)
LYMPHOCYTES ABSOLUTE: 1.9 K/UL (ref 1–5.1)
LYMPHOCYTES RELATIVE PERCENT: 23.8 %
MCH RBC QN AUTO: 33.9 PG (ref 26–34)
MCHC RBC AUTO-ENTMCNC: 35.3 G/DL (ref 31–36)
MCV RBC AUTO: 96.1 FL (ref 80–100)
MONOCYTES ABSOLUTE: 0.7 K/UL (ref 0–1.3)
MONOCYTES RELATIVE PERCENT: 8.8 %
NEUTROPHILS ABSOLUTE: 5 K/UL (ref 1.7–7.7)
NEUTROPHILS RELATIVE PERCENT: 63 %
PDW BLD-RTO: 13.1 % (ref 12.4–15.4)
PERFORMED ON: NORMAL
PLATELET # BLD: 178 K/UL (ref 135–450)
PMV BLD AUTO: 9 FL (ref 5–10.5)
POTASSIUM REFLEX MAGNESIUM: 3.9 MMOL/L (ref 3.5–5.1)
PROTHROMBIN TIME: 11.1 SEC (ref 9.8–13)
RBC # BLD: 3.9 M/UL (ref 4.2–5.9)
SODIUM BLD-SCNC: 140 MMOL/L (ref 136–145)
SPECIMEN STATUS: NORMAL
TOTAL PROTEIN: 6.5 G/DL (ref 6.4–8.2)
TROPONIN: <0.01 NG/ML
WBC # BLD: 7.9 K/UL (ref 4–11)

## 2019-06-28 PROCEDURE — 99291 CRITICAL CARE FIRST HOUR: CPT

## 2019-06-28 PROCEDURE — 93005 ELECTROCARDIOGRAM TRACING: CPT | Performed by: EMERGENCY MEDICINE

## 2019-06-28 PROCEDURE — 80053 COMPREHEN METABOLIC PANEL: CPT

## 2019-06-28 PROCEDURE — 70450 CT HEAD/BRAIN W/O DYE: CPT

## 2019-06-28 PROCEDURE — 84484 ASSAY OF TROPONIN QUANT: CPT

## 2019-06-28 PROCEDURE — 85730 THROMBOPLASTIN TIME PARTIAL: CPT

## 2019-06-28 PROCEDURE — 85610 PROTHROMBIN TIME: CPT

## 2019-06-28 PROCEDURE — 99292 CRITICAL CARE ADDL 30 MIN: CPT

## 2019-06-28 PROCEDURE — 85025 COMPLETE CBC W/AUTO DIFF WBC: CPT

## 2019-06-28 PROCEDURE — 4500000026 HC ED CRITICAL CARE PROCEDURE

## 2019-06-28 PROCEDURE — 96374 THER/PROPH/DIAG INJ IV PUSH: CPT

## 2019-06-28 PROCEDURE — 71045 X-RAY EXAM CHEST 1 VIEW: CPT

## 2019-06-28 PROCEDURE — 6360000002 HC RX W HCPCS: Performed by: EMERGENCY MEDICINE

## 2019-06-28 PROCEDURE — 70498 CT ANGIOGRAPHY NECK: CPT

## 2019-06-28 PROCEDURE — 6360000002 HC RX W HCPCS

## 2019-06-28 PROCEDURE — 6360000004 HC RX CONTRAST MEDICATION: Performed by: EMERGENCY MEDICINE

## 2019-06-28 PROCEDURE — 51702 INSERT TEMP BLADDER CATH: CPT

## 2019-06-28 RX ORDER — SODIUM CHLORIDE 9 MG/ML
INJECTION, SOLUTION INTRAVENOUS
Status: DISCONTINUED
Start: 2019-06-28 | End: 2019-06-28 | Stop reason: HOSPADM

## 2019-06-28 RX ORDER — ONDANSETRON 2 MG/ML
INJECTION INTRAMUSCULAR; INTRAVENOUS
Status: COMPLETED
Start: 2019-06-28 | End: 2019-06-28

## 2019-06-28 RX ORDER — 0.9 % SODIUM CHLORIDE 0.9 %
50 INTRAVENOUS SOLUTION INTRAVENOUS ONCE
Status: DISCONTINUED | OUTPATIENT
Start: 2019-06-28 | End: 2019-06-28 | Stop reason: ALTCHOICE

## 2019-06-28 RX ORDER — SODIUM CHLORIDE 0.9 % (FLUSH) 0.9 %
10 SYRINGE (ML) INJECTION EVERY 12 HOURS SCHEDULED
Status: DISCONTINUED | OUTPATIENT
Start: 2019-06-28 | End: 2019-06-28 | Stop reason: HOSPADM

## 2019-06-28 RX ORDER — SODIUM CHLORIDE 0.9 % (FLUSH) 0.9 %
10 SYRINGE (ML) INJECTION PRN
Status: DISCONTINUED | OUTPATIENT
Start: 2019-06-28 | End: 2019-06-28 | Stop reason: HOSPADM

## 2019-06-28 RX ORDER — DEXTROSE MONOHYDRATE 25 G/50ML
12.5 INJECTION, SOLUTION INTRAVENOUS
Status: DISCONTINUED | OUTPATIENT
Start: 2019-06-28 | End: 2019-06-28 | Stop reason: HOSPADM

## 2019-06-28 RX ADMIN — ALTEPLASE 81 MG: KIT at 18:54

## 2019-06-28 RX ADMIN — IOPAMIDOL 75 ML: 755 INJECTION, SOLUTION INTRAVENOUS at 18:46

## 2019-06-28 RX ADMIN — ONDANSETRON: 2 INJECTION INTRAMUSCULAR; INTRAVENOUS at 19:27

## 2019-06-28 RX ADMIN — ALTEPLASE 9 MG: KIT at 18:53

## 2019-06-28 NOTE — ED NOTES
Pt in [facility], [facility]  06/28/19 5413

## 2019-06-28 NOTE — ED NOTES
NIHSS assessment performed by Dr. Roe Augustin at this time.   NIHSS: [address], RN  06/28/19 1711 [facility], RN  06/28/19 [id number]

## 2019-06-28 NOTE — ED NOTES
CT notified of poss code stroke 15 eta  Pt arrived to ED for poss code [email or web address]   evaluated pt and determined a code stroke be called   @1834 paged  stroke team  @2254 2nd call placed to [facility]  per protocol d/t no return call at this time  @4930 placed 2nd page (3 total calls) to  stroke pager  @[phone] Heather Arora returned call & spoke to [address] [place] [address]  06/28/19 1212

## 2019-06-28 NOTE — ED NOTES
Wife reports significant improvement in patient's speech since episode began.      Daren Chanel RN  06/28/19 3247

## 2019-06-28 NOTE — ED NOTES
Bed: 01  Expected date:   Expected time:   Means of arrival:   Comments:  cjfed code stroke     [facility]  06/28/19 6370

## 2019-06-28 NOTE — ED PROVIDER NOTES
Emergency Department Attending Note    Luisana Martinez DO    Date of ED VIsit: 6/28/2019    CHIEF COMPLAINT  Extremity Weakness (left side weakness since approximately 1750)      HISTORY OF PRESENT ILLNESS  Odell Banerjee is a 58 y.o. male  With Vital signs of BP (!) 167/95   Pulse 69   Resp 19   Wt 235 lb (106.6 kg)   SpO2 99%   BMI 31.01 kg/m²  who presents to the ED with a complaint of witnessed left upper and lower extremity weakness onset approximately 6 PM or slightly before. Blood sugar checked by EMS greater than 100. Patient has a history of paroxysmal A. fib, not on anticoagulation. Takes daily aspirin. No prior history of stroke. No recent surgeries. Patient is awake alert and oriented. He denies any sensory deficit. He was sent straight to CT scan for stroke evaluation. No other complaints, modifying factors or associated symptoms. I have reviewed the following from the nursing documentation.     Past Medical History:   Diagnosis Date    Arthritis     Chronic kidney disease     childhood nephritis    Rosacea     Sleep apnea     compliant with cpap    Thyroid disease      Past Surgical History:   Procedure Laterality Date    COLONOSCOPY  2012    COLONOSCOPY  08/23/2017    polyp    ELBOW SURGERY Left     MOUTH SURGERY      [place] DECOMPRESS FOREARM,EXCIS MUSC/NERV Left 12/24/2018    LEFT CUBITAL TUNNEL RELEASE performed by Dakota Kim MD at [address]  11/06/2017    VASECTOMY      WISDOM TOOTH EXTRACTION  2015     Family History   Problem Relation Age of Onset    Heart Disease Mother     High Blood Pressure Mother     Arthritis Mother     Cancer Father 66        prostate    Alcohol Abuse Brother      Social History     Socioeconomic History    Marital status:      Spouse name: Not on file    Number of children: Not on file    Years of education: Not on file    Highest education level: Not on file Occupational History    Not on file   Social Needs    Financial resource strain: Not on file    Food insecurity:     Worry: Not on file     Inability: Not on file    Transportation needs:     Medical: Not on file     Non-medical: Not on file   Tobacco Use    Smoking status: Former Smoker     Packs/day: 1.00     Years: 38.00     Pack years: 38.00     Types: Cigarettes     Last attempt to quit: 1/1/2009     Years since quitting: 10.4    Smokeless tobacco: Former User   Substance and Sexual Activity    Alcohol use:  Yes     Alcohol/week: 1.2 - 1.8 oz     Types: 2 - 3 Shots of liquor per week     Comment: 2-3 night    Drug use: No    Sexual activity: Not on file   Lifestyle    Physical activity:     Days per week: Not on file     Minutes per session: Not on file    Stress: Not on file   Relationships    Social connections:     Talks on phone: Not on file     Gets together: Not on file     Attends Jewish service: Not on file     Active member of club or organization: Not on file     Attends meetings of clubs or organizations: Not on file     Relationship status: Not on file    Intimate partner violence:     Fear of current or ex partner: Not on file     Emotionally abused: Not on file     Physically abused: Not on file     Forced sexual activity: Not on file   Other Topics Concern    Not on file   Social History Narrative    Not on file     Current Facility-Administered Medications   Medication Dose Route Frequency Provider Last Rate Last Dose    sodium chloride flush 0.9 % injection 10 mL  10 mL Intravenous 2 times per day Tedra Porch, DO        sodium chloride flush 0.9 % injection 10 mL  10 mL Intravenous PRN Tedra Porch, DO        dextrose 50 % IV solution  12.5 g Intravenous Once PRN Tedra Porch, DO        sodium chloride 0.9 % infusion        Stopped at 06/28/19 2923     Current Outpatient Medications   Medication Sig Dispense Refill    rosuvastatin (CRESTOR) 5 MG tablet Take 1 tablet by mouth daily 30 tablet 5    minocycline (MINOCIN;DYNACIN) 100 MG capsule Take 1 capsule by mouth daily 90 capsule 1    Azelaic Acid 15 % GEL Apply 1 drop topically See Admin Instructions 1 Tube 1    metoprolol tartrate (LOPRESSOR) 25 MG tablet Take 1 tablet by mouth 2 times daily 60 tablet 11    levothyroxine (SYNTHROID) 75 MCG tablet Take 1 tablet by mouth Daily 90 tablet 1    pantoprazole (PROTONIX) 40 MG tablet       aspirin EC 81 MG EC tablet Take 1 tablet by mouth daily 30 tablet 5    sucralfate (CARAFATE) 1 GM tablet Take 1 g by mouth 3 times daily (before meals)       fluticasone (FLONASE) 50 MCG/ACT nasal spray 1 spray by Nasal route daily       Allergies   Allergen Reactions    Latex Dermatitis    Sulfa Antibiotics Rash       REVIEW OF SYSTEMS  10 systems reviewed, pertinent positives per HPI otherwise noted to be negative     PHYSICAL EXAM  BP (!) 167/95   Pulse 69   Resp 19   Wt 235 lb (106.6 kg)   SpO2 99%   BMI 31.01 kg/m²   GENERAL APPEARANCE: Awake and alert. Cooperative. In no acute distress. HEAD: Normocephalic. Atraumatic. EYES: PERRL. EOM's grossly intact. ENT: Mucous membranes are pink and moist.   NECK: Supple. HEART: RRR. No murmurs. LUNGS: Respirations unlabored. CTAB. Good air exchange. ABDOMEN: Soft. Non-distended. Non-tender. No masses. No organomegaly. No guarding or rebound. EXTREMITIES: No peripheral edema. Moves all extremities equally. All extremities neurovascularly intact. SKIN: Warm and dry. No acute rashes. NEUROLOGICAL: Alert and oriented. Pupils equal round and reactive to light. Extraocular motion intact. Cranial nerves II through XII intact with the exception of right lower facial droop. Left upper extremity with dense pronator drift, left lower extremity with no movement. Right side unaffected. sensation intact. PSYCHIATRIC: Normal mood and affect. No HI or SI expressed to me.     RADIOLOGY    See below     EKG:     See below      ED COURSE/MDM        ED Course as of    1837 NIH Stroke Scale     Interval: Baseline  Time: 6:37 PM  Person Administering Scale: Ashleigh Shaver   Administer stroke scale items in the order listed. Record performance in each category after each subscale exam. Do not go back and change scores. Follow directions provided for each exam technique. Scores should reflect what the patient does, not what the clinician thinks the patient can do. The clinician should record answers while administering the exam and work quickly. Except where indicated, the patient should not be coached (i.e., repeated requests to patient to make a special effort). 1a  Level of consciousness: 0=alert; keenly responsive  1b. LOC questions:  0=Performs both tasks correctly  1c. LOC commands: 0=Performs both tasks correctly  2. Best Gaze: 0=normal  3. Visual: 0=No visual loss  4. Facial Palsy: 2=Partial paralysis (total or near total paralysis of the lower face)  5a. Motor left arm: 2=Some effort against gravity, limb cannot get to or maintain (if cured) 90 (or 45) degrees, drifts down to bed, but has some effort against gravity  5b. Motor right arm: 0=No drift, limb holds 90 (or 45) degrees for full 10 seconds  6a. motor left le=No movement  6b  Motor right le=No drift, limb holds 90 (or 45) degrees for full 10 seconds  7. Limb Ataxia: 0=Absent  8. Sensory: 0=Normal; no sensory loss  9. Best Language:  0=No aphasia, normal  10. Dysarthria: 1=Mild to moderate, patient slurs at least some words and at worst, can be understood with some difficulty  11. Extinction and Inattention: 0=No abnormality  12. Distal motor function: 0=Normal   Total:  9       [WL]   1838 Stroke team called, pt in Ct.   Onset sx 1800, witnessed, pt with h/o PAF, not on anticoagulation    [WL]   185 Tpa given , discussed with stroke team, Keo, will review CTA to determine dispo for suspected LVO and call back [WL]   1936 CTA reviewed by Neurologist dr Jessica Ornelas, showing LVO A1/2, will transfer to intervention. Repeat NIHSS 7 with mild improvement in arm/leg weakness, now with decreased drift LUE, minimal movement with no resistance to gravity LLE    [WL]   1938 Troponin:    Troponin <0.01 [WL]   1938 APTT:    aPTT 26.1 [WL]   1938 Protime-INR:    Prothrombin Time 11.1   INR 0.97 [WL]   1938 Sample possible blood bank testing:    Specimen Status ALENA [WL]   1938 CBC Auto Differential(!):    WBC 7.9   RBC 3.90(!)   Hemoglobin Quant 13.2(!)   Hematocrit 37.4(!)   MCV 96.1   MCH 33.9   MCHC 35.3   RDW 13.1   Platelet Count 367   MPV 9.0   Neutrophils % 63.0   Lymphocyte % 23.8   Monocytes % 8.8   Eosinophils % 3.7   Basophils % 0.7   Neutrophils # 5.0   Lymphocytes # 1.9   Monocytes # 0.7   Eosinophils # 0.3   Basophils # 0.1 [WL]   1938 Comprehensive Metabolic Panel w/ Reflex to MG(!):    Sodium 140   Potassium 3.9   Chloride 103   CO2 25   Anion Gap 12   Glucose 84   BUN 17   Creatinine 1.2   GFR Non- >60   GFR African American >60   Calcium 9.7   Total Protein 6.5   Albumin 4.2   Albumin/Globulin Ratio 1.8   Bilirubin <0.2   Alk Phos 46   ALT 43(!)   AST 27   Globulin 2.3 [WL]   1938 CTA HEAD NECK W CONTRAST [WL]   1938 No inf/eff   XR CHEST PORTABLE [WL]   1938 N iICH   CT Head WO Contrast [WL]      ED Course User Index  [WL] Naheed Servin DO       Old records were reviewed when applicable. The ED course and plan were reviewed and results discussed with the patient, stroke neurology    CLINICAL IMPRESSION and Doyce Shape was stable and diagnosed with CVA    Patient was treated with TPA, planned intervention on arrival at [address]:  The total Critical Care time is 90 minutes which excludes separately billable procedures.                         Naheed Servin DO  06/28/19 6668

## 2019-06-29 LAB
EKG ATRIAL RATE: 67 BPM
EKG DIAGNOSIS: NORMAL
EKG P AXIS: 45 DEGREES
EKG P-R INTERVAL: 174 MS
EKG Q-T INTERVAL: 422 MS
EKG QRS DURATION: 90 MS
EKG QTC CALCULATION (BAZETT): 445 MS
EKG R AXIS: 18 DEGREES
EKG T AXIS: 13 DEGREES
EKG VENTRICULAR RATE: 67 BPM

## 2019-06-29 PROCEDURE — 93010 ELECTROCARDIOGRAM REPORT: CPT | Performed by: INTERNAL MEDICINE

## 2019-07-04 ENCOUNTER — TELEPHONE (OUTPATIENT)
Dept: EMERGENCY DEPT | Age: 64
End: 2019-07-04

## 2019-07-04 ENCOUNTER — APPOINTMENT (OUTPATIENT)
Dept: CT IMAGING | Age: 64
End: 2019-07-04
Payer: COMMERCIAL

## 2019-07-04 ENCOUNTER — HOSPITAL ENCOUNTER (EMERGENCY)
Age: 64
Discharge: ANOTHER HOSPITAL | End: 2019-07-04
Attending: EMERGENCY MEDICINE
Payer: COMMERCIAL

## 2019-07-04 VITALS
HEART RATE: 67 BPM | WEIGHT: 230 LBS | BODY MASS INDEX: 30.48 KG/M2 | RESPIRATION RATE: 14 BRPM | OXYGEN SATURATION: 96 % | SYSTOLIC BLOOD PRESSURE: 110 MMHG | TEMPERATURE: 98 F | DIASTOLIC BLOOD PRESSURE: 62 MMHG | HEIGHT: 73 IN

## 2019-07-04 DIAGNOSIS — I62.9 INTRACRANIAL HEMORRHAGE (HCC): Primary | ICD-10-CM

## 2019-07-04 LAB
A/G RATIO: 1.4 (ref 1.1–2.2)
ALBUMIN SERPL-MCNC: 4.3 G/DL (ref 3.4–5)
ALP BLD-CCNC: 56 U/L (ref 40–129)
ALT SERPL-CCNC: 51 U/L (ref 10–40)
ANION GAP SERPL CALCULATED.3IONS-SCNC: 14 MMOL/L (ref 3–16)
AST SERPL-CCNC: 43 U/L (ref 15–37)
BACTERIA: ABNORMAL /HPF
BASOPHILS ABSOLUTE: 0 K/UL (ref 0–0.2)
BASOPHILS RELATIVE PERCENT: 0.4 %
BILIRUB SERPL-MCNC: 0.4 MG/DL (ref 0–1)
BILIRUBIN URINE: NEGATIVE
BLOOD, URINE: NEGATIVE
BUN BLDV-MCNC: 21 MG/DL (ref 7–20)
CALCIUM SERPL-MCNC: 9.8 MG/DL (ref 8.3–10.6)
CHLORIDE BLD-SCNC: 103 MMOL/L (ref 99–110)
CLARITY: CLEAR
CO2: 25 MMOL/L (ref 21–32)
COLOR: YELLOW
CREAT SERPL-MCNC: 1.3 MG/DL (ref 0.8–1.3)
EKG ATRIAL RATE: 66 BPM
EKG DIAGNOSIS: NORMAL
EKG P AXIS: 24 DEGREES
EKG P-R INTERVAL: 152 MS
EKG Q-T INTERVAL: 448 MS
EKG QRS DURATION: 86 MS
EKG QTC CALCULATION (BAZETT): 469 MS
EKG R AXIS: 26 DEGREES
EKG T AXIS: 15 DEGREES
EKG VENTRICULAR RATE: 66 BPM
EOSINOPHILS ABSOLUTE: 0.2 K/UL (ref 0–0.6)
EOSINOPHILS RELATIVE PERCENT: 1.5 %
GFR AFRICAN AMERICAN: >60
GFR NON-AFRICAN AMERICAN: 55
GLOBULIN: 3.1 G/DL
GLUCOSE BLD-MCNC: 109 MG/DL (ref 70–99)
GLUCOSE URINE: NEGATIVE MG/DL
HCT VFR BLD CALC: 40.3 % (ref 40.5–52.5)
HEMOGLOBIN: 13.8 G/DL (ref 13.5–17.5)
KETONES, URINE: NEGATIVE MG/DL
LEUKOCYTE ESTERASE, URINE: NEGATIVE
LIPASE: 37 U/L (ref 13–60)
LYMPHOCYTES ABSOLUTE: 1.3 K/UL (ref 1–5.1)
LYMPHOCYTES RELATIVE PERCENT: 12.1 %
MCH RBC QN AUTO: 33.5 PG (ref 26–34)
MCHC RBC AUTO-ENTMCNC: 34.3 G/DL (ref 31–36)
MCV RBC AUTO: 97.7 FL (ref 80–100)
MICROSCOPIC EXAMINATION: YES
MONOCYTES ABSOLUTE: 0.7 K/UL (ref 0–1.3)
MONOCYTES RELATIVE PERCENT: 6.6 %
MUCUS: ABNORMAL /LPF
NEUTROPHILS ABSOLUTE: 8.6 K/UL (ref 1.7–7.7)
NEUTROPHILS RELATIVE PERCENT: 79.4 %
NITRITE, URINE: NEGATIVE
PDW BLD-RTO: 13.2 % (ref 12.4–15.4)
PH UA: 6.5 (ref 5–8)
PLATELET # BLD: 246 K/UL (ref 135–450)
PMV BLD AUTO: 7.8 FL (ref 5–10.5)
POTASSIUM REFLEX MAGNESIUM: 4.4 MMOL/L (ref 3.5–5.1)
PROTEIN UA: ABNORMAL MG/DL
RBC # BLD: 4.12 M/UL (ref 4.2–5.9)
RBC UA: ABNORMAL /HPF (ref 0–2)
RENAL EPITHELIAL, UA: ABNORMAL /HPF
SODIUM BLD-SCNC: 142 MMOL/L (ref 136–145)
SPECIFIC GRAVITY UA: 1.02 (ref 1–1.03)
TOTAL PROTEIN: 7.4 G/DL (ref 6.4–8.2)
URINE REFLEX TO CULTURE: ABNORMAL
URINE TYPE: ABNORMAL
UROBILINOGEN, URINE: 1 E.U./DL
WBC # BLD: 10.8 K/UL (ref 4–11)
WBC UA: ABNORMAL /HPF (ref 0–5)

## 2019-07-04 PROCEDURE — 2580000003 HC RX 258: Performed by: EMERGENCY MEDICINE

## 2019-07-04 PROCEDURE — 93005 ELECTROCARDIOGRAM TRACING: CPT | Performed by: EMERGENCY MEDICINE

## 2019-07-04 PROCEDURE — 96374 THER/PROPH/DIAG INJ IV PUSH: CPT

## 2019-07-04 PROCEDURE — 99285 EMERGENCY DEPT VISIT HI MDM: CPT

## 2019-07-04 PROCEDURE — 51702 INSERT TEMP BLADDER CATH: CPT

## 2019-07-04 PROCEDURE — 6360000002 HC RX W HCPCS: Performed by: EMERGENCY MEDICINE

## 2019-07-04 PROCEDURE — 81001 URINALYSIS AUTO W/SCOPE: CPT

## 2019-07-04 PROCEDURE — 96361 HYDRATE IV INFUSION ADD-ON: CPT

## 2019-07-04 PROCEDURE — 83690 ASSAY OF LIPASE: CPT

## 2019-07-04 PROCEDURE — 80053 COMPREHEN METABOLIC PANEL: CPT

## 2019-07-04 PROCEDURE — 85025 COMPLETE CBC W/AUTO DIFF WBC: CPT

## 2019-07-04 PROCEDURE — 70450 CT HEAD/BRAIN W/O DYE: CPT

## 2019-07-04 PROCEDURE — 93010 ELECTROCARDIOGRAM REPORT: CPT | Performed by: INTERNAL MEDICINE

## 2019-07-04 RX ORDER — ONDANSETRON 2 MG/ML
4 INJECTION INTRAMUSCULAR; INTRAVENOUS ONCE
Status: COMPLETED | OUTPATIENT
Start: 2019-07-04 | End: 2019-07-04

## 2019-07-04 RX ORDER — ATORVASTATIN CALCIUM 10 MG/1
10 TABLET, FILM COATED ORAL DAILY
COMMUNITY
End: 2019-11-08

## 2019-07-04 RX ORDER — 0.9 % SODIUM CHLORIDE 0.9 %
1000 INTRAVENOUS SOLUTION INTRAVENOUS ONCE
Status: COMPLETED | OUTPATIENT
Start: 2019-07-04 | End: 2019-07-04

## 2019-07-04 RX ADMIN — SODIUM CHLORIDE 1000 ML: 9 INJECTION, SOLUTION INTRAVENOUS at 13:07

## 2019-07-04 RX ADMIN — ONDANSETRON 4 MG: 2 INJECTION INTRAMUSCULAR; INTRAVENOUS at 13:07

## 2019-07-04 ASSESSMENT — ENCOUNTER SYMPTOMS
COUGH: 0
DIARRHEA: 0
EYE DISCHARGE: 0
SORE THROAT: 0
VOMITING: 1
BACK PAIN: 0
NAUSEA: 1
ABDOMINAL PAIN: 0

## 2019-07-04 NOTE — ED PROVIDER NOTES
Family History   Problem Relation Age of Onset    Heart Disease Mother     High Blood Pressure Mother     Arthritis Mother     Cancer Father 66        prostate    Alcohol Abuse Brother           SOCIAL HISTORY       Social History     Socioeconomic History    Marital status:      Spouse name: None    Number of children: None    Years of education: None    Highest education level: None   Occupational History    None   Social Needs    Financial resource strain: None    Food insecurity:     Worry: None     Inability: None    Transportation needs:     Medical: None     Non-medical: None   Tobacco Use    Smoking status: Former Smoker     Packs/day: 1.00     Years: 38.00     Pack years: 38.00     Types: Cigarettes     Last attempt to quit: 1/1/2009     Years since quitting: 10.5    Smokeless tobacco: Former User   Substance and Sexual Activity    Alcohol use: Yes     Alcohol/week: 1.2 - 1.8 oz     Types: 2 - 3 Shots of liquor per week     Comment: 2-3 night    Drug use: No    Sexual activity: None   Lifestyle    Physical activity:     Days per week: None     Minutes per session: None    Stress: None   Relationships    Social connections:     Talks on phone: None     Gets together: None     Attends Muslim service: None     Active member of club or organization: None     Attends meetings of clubs or organizations: None     Relationship status: None    Intimate partner violence:     Fear of current or ex partner: None     Emotionally abused: None     Physically abused: None     Forced sexual activity: None   Other Topics Concern    None   Social History Narrative    None       SCREENINGS   NIH Stroke Scale  Interval: Baseline  Level of Consciousness (1a. ): Alert  LOC Questions (1b. ):  Answers both correctly  LOC Commands (1c. ): Obeys both correctly  Best Gaze (2. ): Normal  Visual (3. ): No visual loss  Facial Palsy (4. ): Normal  Motor Arm, Left (5a. ): No drift  Motor Arm, Right (5b. ): No drift  Motor Leg, Left (6a. ): No drift  Motor Leg, Right (6b. ): Drift, but does not hit bed  Limb Ataxia (7. ): Absent  Sensory (8. ): Normal  Best Language (9. ): No aphasia  Dysarthria (10. ): Normal  Extinction and Inattention (11): No neglect  Total: 1Glasgow Coma Scale  Eye Opening: Spontaneous  Best Verbal Response: Oriented  Best Motor Response: Obeys commands  Kayla Coma Scale Score: 15        PHYSICAL EXAM    (up to 7 for level 4, 8 or more for level 5)     ED Triage Vitals [07/04/19 1226]   BP Temp Temp src Pulse Resp SpO2 Height Weight   (!) 148/84 98 °F (36.7 °C) -- 64 15 100 % 6' 1\" (1.854 m) 230 lb (104.3 kg)       Physical Exam   Constitutional: He is oriented to person, place, and time. He appears well-developed and well-nourished. No distress. HENT:   Head: Normocephalic and atraumatic. Right Ear: External ear normal.   Left Ear: External ear normal.   Nose: Nose normal.   Mouth/Throat: Oropharynx is clear and moist. No oropharyngeal exudate. Eyes: Pupils are equal, round, and reactive to light. Conjunctivae are normal.   Neck: Normal range of motion. Neck supple. Cardiovascular: Normal rate, regular rhythm, normal heart sounds and intact distal pulses. Exam reveals no gallop and no friction rub. No murmur heard. Pulmonary/Chest: Effort normal and breath sounds normal. No respiratory distress. He has no wheezes. Abdominal: Soft. Bowel sounds are normal. He exhibits no distension. There is no tenderness. There is no rebound and no guarding. Musculoskeletal: Normal range of motion. He exhibits no edema or tenderness. Lymphadenopathy:     He has no cervical adenopathy. Neurological: He is alert and oriented to person, place, and time. No cranial nerve deficit. Skin: Skin is warm and dry. No rash noted. Psychiatric: He has a normal mood and affect. Flat affect.        DIAGNOSTIC RESULTS   LABS:    Results for orders placed or performed during the hospital encounter of Physician who either signs or Co-signs this chart in the absence of a cardiologist.  Please see their note for interpretation of EKG. EKG Interpretation    Interpreted by emergency department physician    Rhythm: normal sinus   Rate: normal  Axis: normal  Ectopy: premature atrial contraction  Conduction: normal  ST Segments: normal  T Waves: normal  Q Waves: none    Clinical Impression: Normal sinus rhythm with PAC      RADIOLOGY:   Non-plain film images such as CT, Ultrasound and MRI are read by the radiologist.Plain radiographic images are visualized and preliminarily interpreted by the  ED Provider with the belowfindings:    Interpretation per the Radiologist below, if available at the time of this note:    CT HEAD WO CONTRAST   Final Result   Addendum 1 of 1   ADDENDUM:   Critical results were called by Dr. Sunny Chang. Amber Lau MD to [facility]   on 7/4/2019 at 13:32. Final            PROCEDURES   Unless otherwise noted below, none     Procedures    CRITICAL CARE TIME   Total critical care time today provided was 49 minutes. This excludes seperately billable procedures and family discussion time. Provided for acute intracranial hemorrhage requiring intervention with concern for potential decompensation. CONSULTS:  IP CONSULT TO STROKE TEAM      EMERGENCY DEPARTMENT COURSE and DIFFERENTIAL DIAGNOSIS/MDM:   Vitals:    Vitals:    07/04/19 1404 07/04/19 1433 07/04/19 1504 07/04/19 1534   BP: 135/74 135/87 123/73 (!) 106/91   Pulse: 67 62 63 62   Resp: 20 20 16 16   Temp:       SpO2: 100% 97% 99% 100%   Weight:       Height:           Patient was given the following medications:  Medications   0.9 % sodium chloride bolus (0 mLs Intravenous Stopped 7/4/19 1446)   ondansetron (ZOFRAN) injection 4 mg (4 mg Intravenous Given 7/4/19 1307)     I discussed the case prior to the patient's arrival with the on-call outpatient stroke physician for [facility].   Imaging was obtained and

## 2019-07-04 NOTE — TELEPHONE ENCOUNTER
Vascular Neurology Outpatient telephone call     Michael Abel 63M  Date of call: 7/4/2019    Patient is a 58year old male with a history of afib, was not on [address] when he presented to [facility] ED on 6/28/2019 with acute ischemic stroke in right MCKENNA distribution with A1 occlusion resulting in left leg weakness. Patient received IV tPA and was transferred to [facility] for thrombectomy. During the procedure, patient had some nausea/vomiting, and as a technically difficult procedure (with patient movement as well as anatomically) the procedure was aborted after only partial reperfusion (TICI 2a, < 50% reperfusion). Patient's N/V subside,d and he recovered some left leg functioning during his admission and was discharged home on 6/30 with NIHSS = 1 with mild left leg drift. He was discharged on aspirin 81 mg with plan to start Eliquis on 7/5. Patient's wife called today concerned for nausea, vomiting, and diarrhea which was new since patient's discharge. She has tried over the counter tx for N/V to little benefit. No new headache or new/worsening neurological findings. With concern for dehydration/hypovolemia as well as possible intracranial cause of intractable N/V such as posterior circulation stroke or hemorrhage, recommended patient report to local emergency room, [facility], for evaluation. I reached out to the ED physician in [facility] to endorse the case and give a heads-up and preliminary recs:    - Evaluation for dehydration/hypovolemia and new/worsening neurological symptoms.  - Consider IV fluids   - Consider CT head without contrast for intractable N/V   - Would only Consider CTA head and neck if concerned for new/worsened neurological finding.   - If further concern for new acute ischemic stroke, please feel free to page [address] Stroke Team at [phone].    - If considering non-emergent transfer to [facility], would contact  Transfer Center/Bed Management at [phone] (BEDS). Case endorsed to Dr. Myrna De Los Santos (ED physician at [facility]). Awais Aguilera M.D.   Attending Vascular Neurologist  Jos[address]  7/4/2019 12:08 PM

## 2019-07-04 NOTE — ED NOTES
[facility]    Report [id number]     Farzad Bur  07/04/19 1631

## 2019-07-08 ENCOUNTER — OFFICE VISIT (OUTPATIENT)
Dept: FAMILY MEDICINE CLINIC | Age: 64
End: 2019-07-08
Payer: COMMERCIAL

## 2019-07-08 VITALS
HEART RATE: 64 BPM | BODY MASS INDEX: 29.95 KG/M2 | SYSTOLIC BLOOD PRESSURE: 102 MMHG | DIASTOLIC BLOOD PRESSURE: 82 MMHG | RESPIRATION RATE: 16 BRPM | OXYGEN SATURATION: 99 % | WEIGHT: 227 LBS

## 2019-07-08 DIAGNOSIS — Z09 HOSPITAL DISCHARGE FOLLOW-UP: Primary | ICD-10-CM

## 2019-07-08 DIAGNOSIS — I63.9 CEREBROVASCULAR ACCIDENT (CVA), UNSPECIFIED MECHANISM (HCC): ICD-10-CM

## 2019-07-08 PROCEDURE — 1111F DSCHRG MED/CURRENT MED MERGE: CPT | Performed by: NURSE PRACTITIONER

## 2019-07-08 PROCEDURE — 3017F COLORECTAL CA SCREEN DOC REV: CPT | Performed by: NURSE PRACTITIONER

## 2019-07-08 PROCEDURE — 1036F TOBACCO NON-USER: CPT | Performed by: NURSE PRACTITIONER

## 2019-07-08 PROCEDURE — 99214 OFFICE O/P EST MOD 30 MIN: CPT | Performed by: NURSE PRACTITIONER

## 2019-07-08 PROCEDURE — G8427 DOCREV CUR MEDS BY ELIG CLIN: HCPCS | Performed by: NURSE PRACTITIONER

## 2019-07-08 PROCEDURE — G8417 CALC BMI ABV UP PARAM F/U: HCPCS | Performed by: NURSE PRACTITIONER

## 2019-07-08 PROCEDURE — G8598 ASA/ANTIPLAT THER USED: HCPCS | Performed by: NURSE PRACTITIONER

## 2019-07-08 RX ORDER — POLYETHYLENE GLYCOL 3350 17 G/17G
17 POWDER, FOR SOLUTION ORAL DAILY
Qty: 510 G | Refills: 0 | Status: SHIPPED | OUTPATIENT
Start: 2019-07-08 | End: 2019-08-07

## 2019-07-08 ASSESSMENT — ENCOUNTER SYMPTOMS
NAUSEA: 0
RESPIRATORY NEGATIVE: 1
DIARRHEA: 0
VOMITING: 0
CONSTIPATION: 1

## 2019-07-08 NOTE — PROGRESS NOTES
Post-Discharge Transitional Care Management Services or Hospital Follow Up      Luann Rader   YOB: 1955    Date of Office Visit:  7/8/2019  Date of Hospital Admission: 6-28-19 and 7-4-19   Date of Hospital Discharge: 7-1-19 and 7-5-19    Care management risk score Rising risk (score 2-5) and Complex Care (Scores >=6): 4     Non face to face  following discharge, date last encounter closed (first attempt may have been earlier): *No documented post hospital discharge outreach found in the last 14 days     Call initiated 2 business days of discharge: *No response recorded in the last 14 days    Patient Active Problem List   Diagnosis    Hypothyroidism    Gastroesophageal reflux disease    Sleep apnea    Paroxysmal atrial fibrillation (HCC)       Allergies   Allergen Reactions    Latex Dermatitis    Sulfa Antibiotics Rash       Medications listed as ordered at the time of discharge from hospital  yes    Medications marked \"taking\" at this time  Outpatient Medications Marked as Taking for the 7/8/19 encounter (Office Visit) with SILVIO Galaviz CNP   Medication Sig Dispense Refill    apixaban (ELIQUIS) 5 MG TABS tablet Take 5 mg by mouth      polyethylene glycol (GLYCOLAX) powder Take 17 g by mouth daily 510 g 0    atorvastatin (LIPITOR) 10 MG tablet Take 10 mg by mouth daily      minocycline (MINOCIN;DYNACIN) 100 MG capsule Take 1 capsule by mouth daily 90 capsule 1    Azelaic Acid 15 % GEL Apply 1 drop topically See Admin Instructions 1 Tube 1    metoprolol tartrate (LOPRESSOR) 25 MG tablet Take 1 tablet by mouth 2 times daily 60 tablet 11    levothyroxine (SYNTHROID) 75 MCG tablet Take 1 tablet by mouth Daily 90 tablet 1    pantoprazole (PROTONIX) 40 MG tablet       fluticasone (FLONASE) 50 MCG/ACT nasal spray 1 spray by Nasal route daily          Medications patient taking as of now reconciled against medications ordered at time of hospital discharge: Yes    Chief Complaint

## 2019-07-10 ENCOUNTER — HOSPITAL ENCOUNTER (OUTPATIENT)
Dept: PHYSICAL THERAPY | Age: 64
Setting detail: THERAPIES SERIES
Discharge: HOME | End: 2019-07-10
Payer: COMMERCIAL

## 2019-07-10 PROCEDURE — 97110 THERAPEUTIC EXERCISES: CPT

## 2019-07-10 PROCEDURE — 97161 PT EVAL LOW COMPLEX 20 MIN: CPT

## 2019-07-10 NOTE — PROGRESS NOTES
The following patient has been evaluated for physical therapy services. In order for therapy to continue, Medicare requires physician review of the treatment plan. Please review the attached evaluation and/or summary of the patient's plan of care, and verify that you agree by signing below and sending it back to our office. Thank you for this referral.    Physician signature_______________________ Date________________    Fax to:   [facility] [phone]              NEUROLOGICAL / GENERAL MOBILITY PHYSICAL THERAPY EVALUATION      Evaluation Date: 7/10/2019        Patient Name:  [place]       YOB: 1955        Medical Diagnosis: CVA  ICD 10:  163.9  Treatment Diagnosis:  Weakness, balance impairment    Onset Date:   6/28/19     Referral Date:  7/1/19    Referring Physician:  Uziel Reyes MD        Visits Allowed/Insurance/Certification Information:  25 per massimo year. [facility] Choice Plus ; 30% after deductible, 100% now. Restrictions/Precautions:  No restrictions    Pt's Occupation/Job Duties:  Employed FT. On disability, plans to retire. Health History reviewed with pt:   [x]   Yes   []   No          SUBJECTIVE FINDINGS  Pt reports sudden onset of sxs of a CVA on 6/28. Not responding, weakness in L UE/LE. Pt was taken to [facility], given TPA and air cared to [facility] for an emergent clot removal procedure, all within 2 hours of the onset of the symptoms. Remained in UC for a couple of days. At home, developed emesis and diarrhea, so returned to UC. Stayed for another day, then returned home. Does report constipation followed by diarrhea, but it is resolving. Was given exercises in the hospital and does do them on occasion. Pain     []Yes   [x]No   Patient describes pain to be not a concern     Current Functional Limitations:   [x]Yes   []No   Functional Complaints: On blood thinners, so he is being very cautious. Not driving.   Retiring minutes   Total Treatment Time:  40  minutes    Veronica Butcher PT, MPT, OMT-c [id number]

## 2019-07-17 ENCOUNTER — HOSPITAL ENCOUNTER (OUTPATIENT)
Dept: PHYSICAL THERAPY | Age: 64
Setting detail: THERAPIES SERIES
Discharge: HOME | End: 2019-07-17
Payer: COMMERCIAL

## 2019-07-17 PROCEDURE — 97112 NEUROMUSCULAR REEDUCATION: CPT

## 2019-07-17 PROCEDURE — 97110 THERAPEUTIC EXERCISES: CPT

## 2019-07-22 ENCOUNTER — HOSPITAL ENCOUNTER (OUTPATIENT)
Dept: OCCUPATIONAL THERAPY | Age: 64
Setting detail: THERAPIES SERIES
Discharge: HOME | End: 2019-07-22
Payer: COMMERCIAL

## 2019-07-22 PROCEDURE — 97166 OT EVAL MOD COMPLEX 45 MIN: CPT

## 2019-07-22 PROCEDURE — 97530 THERAPEUTIC ACTIVITIES: CPT

## 2019-07-22 NOTE — FLOWSHEET NOTE
The following patient has been evaluated for occupational therapy services. In order for therapy to continue, insurance requires physician review of the treatment plan. Please review the attached evaluation and/or summary of the patient's plan of care, and verify that you agree by signing below and sending it back to our office. Thank you for this referral.    Physician signature_______________________ Date________________    Fax to:   [facility] [phone]            OCCUPATIONAL THERAPY EVALUATION  Evaluation Date:  7/22/2019    Patient Name: Chana Cooper  YOB: 1955   Medical Diagnosis:  CVA  Treatment Diagnosis:Acute ischemic stroke    Referring Physician: Lisa Lovelace MD  Referral Date:  7/05/2019  Onset Date:      Visits Allowed/Insurance/Certification Information 25 per massimo year. [facility] Choice Plus ; 30% after deductible, 100% now. Restrictions/Precautions None    Pt's Occupation/Job Duties:  Employed FT. On Short term disability, plans to retire at end of August.    SUBJECTIVE FINDINGS  Pain  Pt endorses no pain       Patient goal for therapy: \"I don't really know, I don't have any issues \"    PMH: Pt has a hx of left cubital tunnel release 12/2018. History of Present Illness:  Pt  is  and lives with his wife in a ranch home with 1-2 MIKE and no rail. He works full time. Per chart, comfirmed with pt today:   \"Pt reports sudden onset of sxs of a CVA on 6/28. Not responding, weakness in L UE/LE. Pt was taken to [facility], given TPA and air cared to [facility] for an emergent clot removal procedure, all within 2 hours of the onset of the symptoms. Remained in UC for a couple of days. At home, developed emesis and diarrhea, so returned to UC. Stayed for another day, then returned home. Does report constipation followed by diarrhea, but it is resolving. Was given exercises in the hospital and does do them on occasion. \"      Current Functional Limitations:

## 2019-07-23 DIAGNOSIS — E03.9 HYPOTHYROIDISM, UNSPECIFIED TYPE: ICD-10-CM

## 2019-07-23 RX ORDER — LEVOTHYROXINE SODIUM 0.07 MG/1
75 TABLET ORAL DAILY
Qty: 90 TABLET | Refills: 1 | Status: SHIPPED | OUTPATIENT
Start: 2019-07-23 | End: 2019-12-03 | Stop reason: SDUPTHER

## 2019-07-24 ENCOUNTER — OFFICE VISIT (OUTPATIENT)
Dept: CARDIOLOGY CLINIC | Age: 64
End: 2019-07-24
Payer: COMMERCIAL

## 2019-07-24 VITALS
BODY MASS INDEX: 29.85 KG/M2 | DIASTOLIC BLOOD PRESSURE: 58 MMHG | WEIGHT: 225.2 LBS | HEIGHT: 73 IN | OXYGEN SATURATION: 98 % | SYSTOLIC BLOOD PRESSURE: 112 MMHG | HEART RATE: 60 BPM

## 2019-07-24 DIAGNOSIS — I63.9 CEREBROVASCULAR ACCIDENT (CVA), UNSPECIFIED MECHANISM (HCC): ICD-10-CM

## 2019-07-24 DIAGNOSIS — I48.0 PAF (PAROXYSMAL ATRIAL FIBRILLATION) (HCC): Primary | ICD-10-CM

## 2019-07-24 PROBLEM — I48.19 PERSISTENT ATRIAL FIBRILLATION (HCC): Status: ACTIVE | Noted: 2019-01-24

## 2019-07-24 PROCEDURE — 1036F TOBACCO NON-USER: CPT | Performed by: NURSE PRACTITIONER

## 2019-07-24 PROCEDURE — G8598 ASA/ANTIPLAT THER USED: HCPCS | Performed by: NURSE PRACTITIONER

## 2019-07-24 PROCEDURE — G8417 CALC BMI ABV UP PARAM F/U: HCPCS | Performed by: NURSE PRACTITIONER

## 2019-07-24 PROCEDURE — 99214 OFFICE O/P EST MOD 30 MIN: CPT | Performed by: NURSE PRACTITIONER

## 2019-07-24 PROCEDURE — 3017F COLORECTAL CA SCREEN DOC REV: CPT | Performed by: NURSE PRACTITIONER

## 2019-07-24 PROCEDURE — G8427 DOCREV CUR MEDS BY ELIG CLIN: HCPCS | Performed by: NURSE PRACTITIONER

## 2019-07-24 PROCEDURE — 93000 ELECTROCARDIOGRAM COMPLETE: CPT | Performed by: NURSE PRACTITIONER

## 2019-07-24 RX ORDER — FOLIC ACID/MULTIVIT,IRON,MINER .4-18-35
1 TABLET,CHEWABLE ORAL DAILY
COMMUNITY

## 2019-07-24 RX ORDER — DILTIAZEM HYDROCHLORIDE 180 MG/1
180 CAPSULE, COATED, EXTENDED RELEASE ORAL DAILY
Qty: 90 CAPSULE | Refills: 3 | Status: SHIPPED | OUTPATIENT
Start: 2019-07-24 | End: 2019-09-06 | Stop reason: SDUPTHER

## 2019-07-26 ENCOUNTER — HOSPITAL ENCOUNTER (OUTPATIENT)
Dept: PHYSICAL THERAPY | Age: 64
Setting detail: THERAPIES SERIES
Discharge: HOME | End: 2019-07-26
Payer: COMMERCIAL

## 2019-07-26 PROCEDURE — 97112 NEUROMUSCULAR REEDUCATION: CPT

## 2019-07-26 PROCEDURE — 97110 THERAPEUTIC EXERCISES: CPT

## 2019-07-30 ENCOUNTER — TELEPHONE (OUTPATIENT)
Dept: PULMONOLOGY | Age: 64
End: 2019-07-30

## 2019-07-30 ENCOUNTER — OFFICE VISIT (OUTPATIENT)
Dept: PULMONOLOGY | Age: 64
End: 2019-07-30
Payer: COMMERCIAL

## 2019-07-30 VITALS
SYSTOLIC BLOOD PRESSURE: 116 MMHG | HEIGHT: 73 IN | HEART RATE: 61 BPM | TEMPERATURE: 98.1 F | OXYGEN SATURATION: 99 % | WEIGHT: 230 LBS | DIASTOLIC BLOOD PRESSURE: 71 MMHG | BODY MASS INDEX: 30.48 KG/M2 | RESPIRATION RATE: 16 BRPM

## 2019-07-30 DIAGNOSIS — G47.10 HYPERSOMNIA: ICD-10-CM

## 2019-07-30 DIAGNOSIS — G47.33 OBSTRUCTIVE SLEEP APNEA SYNDROME: Primary | ICD-10-CM

## 2019-07-30 DIAGNOSIS — R06.83 SNORING: ICD-10-CM

## 2019-07-30 DIAGNOSIS — Z72.821 POOR SLEEP HYGIENE: ICD-10-CM

## 2019-07-30 DIAGNOSIS — Z71.89 CPAP USE COUNSELING: ICD-10-CM

## 2019-07-30 DIAGNOSIS — F51.09 SITUATIONAL INSOMNIA: ICD-10-CM

## 2019-07-30 PROCEDURE — 3017F COLORECTAL CA SCREEN DOC REV: CPT | Performed by: NURSE PRACTITIONER

## 2019-07-30 PROCEDURE — G8598 ASA/ANTIPLAT THER USED: HCPCS | Performed by: NURSE PRACTITIONER

## 2019-07-30 PROCEDURE — G8427 DOCREV CUR MEDS BY ELIG CLIN: HCPCS | Performed by: NURSE PRACTITIONER

## 2019-07-30 PROCEDURE — 1036F TOBACCO NON-USER: CPT | Performed by: NURSE PRACTITIONER

## 2019-07-30 PROCEDURE — G8417 CALC BMI ABV UP PARAM F/U: HCPCS | Performed by: NURSE PRACTITIONER

## 2019-07-30 PROCEDURE — 99214 OFFICE O/P EST MOD 30 MIN: CPT | Performed by: NURSE PRACTITIONER

## 2019-07-30 ASSESSMENT — SLEEP AND FATIGUE QUESTIONNAIRES
HOW LIKELY ARE YOU TO NOD OFF OR FALL ASLEEP WHILE SITTING AND READING: 3
HOW LIKELY ARE YOU TO NOD OFF OR FALL ASLEEP WHEN YOU ARE A PASSENGER IN A CAR FOR AN HOUR WITHOUT A BREAK: 3
ESS TOTAL SCORE: 17
HOW LIKELY ARE YOU TO NOD OFF OR FALL ASLEEP WHILE WATCHING TV: 3
HOW LIKELY ARE YOU TO NOD OFF OR FALL ASLEEP WHILE SITTING INACTIVE IN A PUBLIC PLACE: 1
HOW LIKELY ARE YOU TO NOD OFF OR FALL ASLEEP WHEN YOU ARE A PASSENGER IN A CAR FOR AN HOUR WITHOUT A BREAK: 3
HOW LIKELY ARE YOU TO NOD OFF OR FALL ASLEEP WHILE SITTING AND TALKING TO SOMEONE: 2
HOW LIKELY ARE YOU TO NOD OFF OR FALL ASLEEP WHILE LYING DOWN TO REST IN THE AFTERNOON WHEN CIRCUMSTANCES PERMIT: 3
HOW LIKELY ARE YOU TO NOD OFF OR FALL ASLEEP WHILE LYING DOWN TO REST IN THE AFTERNOON WHEN CIRCUMSTANCES PERMIT: 3
HOW LIKELY ARE YOU TO NOD OFF OR FALL ASLEEP WHILE WATCHING TV: 3
HOW LIKELY ARE YOU TO NOD OFF OR FALL ASLEEP WHILE SITTING QUIETLY AFTER LUNCH WITHOUT ALCOHOL: 3
NECK CIRCUMFERENCE (INCHES): 17.25
HOW LIKELY ARE YOU TO NOD OFF OR FALL ASLEEP WHILE SITTING AND READING: 3
HOW LIKELY ARE YOU TO NOD OFF OR FALL ASLEEP WHILE SITTING QUIETLY AFTER LUNCH WITHOUT ALCOHOL: 3
HOW LIKELY ARE YOU TO NOD OFF OR FALL ASLEEP IN A CAR, WHILE STOPPED FOR A FEW MINUTES IN TRAFFIC: 0
ESS TOTAL SCORE: 20
HOW LIKELY ARE YOU TO NOD OFF OR FALL ASLEEP WHILE SITTING INACTIVE IN A PUBLIC PLACE: 3
HOW LIKELY ARE YOU TO NOD OFF OR FALL ASLEEP IN A CAR, WHILE STOPPED FOR A FEW MINUTES IN TRAFFIC: 0
HOW LIKELY ARE YOU TO NOD OFF OR FALL ASLEEP WHILE SITTING AND TALKING TO SOMEONE: 1

## 2019-07-30 NOTE — PATIENT INSTRUCTIONS
.Please keep all of your future appointments scheduled by [facility] Pulmonary office. Out of respect for other patients and providers, you may be asked to reschedule your appointment if you arrive later than your scheduled appointment time. Appointments cancelled less than 24hrs in advance will be considered a no show. Patients with three missed appointments within 1 year or four missed appointments within 2 years can be dismissed from the practice. You may receive a survey regarding the care you received during your visit. Your input is valuable to us. We encourage you to complete and return your survey. We hope you will choose us in the future for your healthcare needs. Remember to bring your sleep machine, cord and mask to each appointment    You should have a follow up appointment scheduled with a sleep medicine provider within 12 months. Routine parts, masks, tubing and filters should all be obtainable from your DME (equipment) provider. Any problems related to mask fit, comfort or functioning of equipment should also be addressed directly with your DME provider. If you are unable to resolve problems, then please notify our office and schedule an appointment to be seen sooner than the usual follow up appointment. For all patients who are evaluated for sleep disorders, never drive or operate motorized equipment while sleepy, fatigued or groggy. Please bring in all of your sleep equipment to all of your appointments, including machine, mask, cords and hoses. Here are some tips to to getting better sleep  1- Avoid napping during the day: This will ensure you are tired at bedtime. If you have to take a nap, sleep less than one hour, before 3 pm.   2- Exercise regularly, but not right before bed: but the timing of the workout is important. Exercising in the morning or early afternoon will not interfere with sleep.   Exercising within two hours before bedtime can decrease

## 2019-07-30 NOTE — PROGRESS NOTES
Patient ID: Emeterio Lantigua is a 58 y.o. male who is being seen today for   Chief Complaint   Patient presents with    Sleep Apnea     1 yr         HPI:     Emeterio Lantigua is a 58 y.o. male in office with wife for REINIER follow up. States he had a stroke 1 month ago which required thrombectomy at [facility] DR SASCHA THAKKAR.  States he is almost back to baseline. states sleep has been poor since having stroke, although states past 2 nights sleep has improved. Patient is using CPAP 2-6 hrs/night. Using humidifier. Very rare mild snoring on CPAP. The pressure is well tolerated. The mask is comfortable- full face F20. No mask leak. Some daytime sleepiness, has been dozing during the day since having stroke. Denies nodding off when driving. No dry nose or throat. +fatigue. Bedtime is 1 am and rise time is 4-6 am. (prior to stroke bedtime 9-10 am - up at 5 am). Sleep onset is usually few-10 minutes. Wakes up 1-2 times at night total. 1-2 nocturia. It takes few- unknown minutes to fall back a sleep. +naps during the day, now dozing during the day since stroke. No headache in am. No weight gain. 3 caffienated beverages during the day. 1 drink a night.  ESS is 17        Sleep Medicine 7/30/2019 7/30/2019 5/10/2018 12/12/2017   Sitting and reading 3 3 3 3   Watching TV 3 3 3 3   Sitting, inactive in a public place (e.g. a theatre or a meeting) 1 3 0 0   As a passenger in a car for an hour without a break 3 3 3 3   Lying down to rest in the afternoon when circumstances permit 3 3 3 3   Sitting and talking to someone 1 2 0 0   Sitting quietly after a lunch without alcohol 3 3 0 3   In a car, while stopped for a few minutes in traffic 0 0 0 0   Total score 17 20 12 15   Neck circumference - 17.25 17.25 17.5       Past Medical History:  Past Medical History:   Diagnosis Date    Arthritis     Cerebral artery occlusion with cerebral infarction ([facility] [address]     Chronic kidney disease     childhood nephritis    Rosacea     Sleep apnea     compliant fluticasone (FLONASE) 50 MCG/ACT nasal spray, 1 spray by Nasal route daily, Disp: , Rfl:       Objective:   PHYSICAL EXAM:    /71 (Site: Left Upper Arm, Position: Sitting)   Pulse 61   Temp 98.1 °F (36.7 °C) (Oral)   Resp 16   Ht 6' 1\" (1.854 m)   Wt 230 lb (104.3 kg)   SpO2 99%   BMI 30.34 kg/m²     Physical exam:  Gen: No acute distress. Obese. BMI of 30.34  Eyes: PERRL. No sclera icterus. No conjunctival injection. ENT: No discharge. Pharynx clear. Mallampati class IV. Neck: Trachea midline. No obvious mass. Neck circumference 17.25\"  Resp: No accessory muscle use. No crackles. No wheezes. No rhonchi. CV: Regular rate. Regular rhythm. No murmur or rub. Skin: Warm and dry. M/S: No cyanosis. No obvious joint deformity. Neuro: Awake. Alert. Moves all four extremities. Psych: Oriented x 3. No anxiety. DATA:   HST 1/31/18 AHI 41.9, low SPO2 75%. Started auto CPAP 8-16 CM H2O    CPAP compliance data:  Compliance download report from 4/10/18 to 5/9/18 reviewed today by me and showed patient is using machine 6:17 hrs/night with 97% compliance and AHI 4.0 within this time frame. 29/30days with greater than 4 hours of machine use. 90% pressure 13.8 cm H20 on auto CPAP 8-16    Compliance download report from 6/30/19 to 7/29/19 reviewed today by me and showed patient is using machine 4:19 hrs/night with 43% compliance and AHI 2.7 within this time frame. 13/30days with greater than 4 hours of machine use. 90% pressure 14.6 cm H20 on auto CPAP 11-16 cm H2O    Assessment:      · Severe REINIER.   Auto CPAP 11-16 CM H2O. suboptimal compliance on review today  · Hypersomnia-worse since stroke  · Snoring- very mild with CPAP  · Sleep maintenance insomnia-poor sleep hygiene, suboptimal treatment of sleep apnea, comorbid conditions, recent stroke likely contributing    Plan:      -Send order to change to CPAP 13-17 cm H2O  -Please bring CPAP to office since  -Follow AHI and adjust pressure if

## 2019-07-31 ENCOUNTER — HOSPITAL ENCOUNTER (OUTPATIENT)
Dept: PHYSICAL THERAPY | Age: 64
Setting detail: THERAPIES SERIES
Discharge: HOME | End: 2019-07-31
Payer: COMMERCIAL

## 2019-07-31 PROCEDURE — 97112 NEUROMUSCULAR REEDUCATION: CPT

## 2019-07-31 PROCEDURE — 97110 THERAPEUTIC EXERCISES: CPT

## 2019-07-31 NOTE — FLOWSHEET NOTE
pain.    DF and eversion   2x10 gray theraband    Standing HS Curls (bilat) 5# 2x15 Verbal cuing to actively keep LE extended: uses knee block (at stairs) for tactile cuing when hip flexes                   Rocker board side/side & f/b 1x10  Without UE support     SLB w/ EO (bilat)  2x15 sec With 2 finger support x 1set, supervision      SLB (bilat)  with trunk flexion  1x10 sec  Verbal cuing for 'slow & controlled', close supervision     SLB w/ EC (bilat) 2x15 sec  unilat 2 finger support & CGA    Foam balance no UE support  NV EO then EC then rotations and nods all one minute. BOSU balance (ball side up) 2x10  Weight shifts s/s & f/b without UE support; lifting foot; cuing for normal SHENG, CGA     MMT: 4+/5 L knee flexion, 5/5 all other BLE  Stair negotiation x5 steps, reciprocally without handrail  HEP: SL squats with UE support, SLB with UE support     Therapeutic Exercise/Home Exercise Program:  15  minutes  See above    Neuromuscular Re-Education:  15 minutes  See above    Functional Outcome Measure:   []NA  Measure Used: Tinetti from   Date Assessed: 7/10  Score: 21    Assessment/Treatment/Activity Tolerance:  Pt responded well to increased reps with therapeutic exercises this visit, had some difficulty with balance exercises but able to complete with longer duration after several trials. Pt required rest breaks in between exercises due to fatigue, especially after strengthening exercises. Pt requires supervision-CGA for balance exercises this date. HEP given this date to increase therapeutic benefits. Pt will continue to be progressed during therapy visits to increase LE strength & balance for ADL's & safety. Patients response to treatment:   [x]Patient tolerated treatment well []Patient limited by fatigue   []Patient limited by pain  []Patient limited by other medical complications   []Other:     GOALS  Estab. 7/10/19  Short Term Goals: 2  weeks 7/24/19 Long Term Goals:  4   weeks 8/7/19   1). Establish HEP MET 1). Pt independent with HEP    2). Increase strength 1/3 grade  2). Rate strength 5/5 bilat   3). amb up/down 4 step case, reciprocal with 2 rail support MET 3). amb up/dpwn full flight , reciprocal pattern , 1 rail   4). Tolerate standing balance activities with CGA, EO/EC MET 4). Tolerate High Level standing balance activities indep   5). 5).   6).       Prognosis: [x]Good   []Fair   []Poor    Patient Requires Follow-up:  [x]Yes  []No    Plan: []Plan of care initiated     [x]Continue per plan of care 2x/3wk (5 visits)    [] Alter current plan (see comments)    []Hold pending MD visit []Discharge    Timed Code Treatment Minutes:  30     Total Treatment Minutes:  30    Electronically signed by:  Jose Byers PT, DPT, ATC

## 2019-08-02 ENCOUNTER — HOSPITAL ENCOUNTER (OUTPATIENT)
Dept: PHYSICAL THERAPY | Age: 64
Setting detail: THERAPIES SERIES
Discharge: HOME | End: 2019-08-02
Payer: COMMERCIAL

## 2019-08-02 PROCEDURE — 97112 NEUROMUSCULAR REEDUCATION: CPT

## 2019-08-02 PROCEDURE — 97110 THERAPEUTIC EXERCISES: CPT

## 2019-08-09 LAB — TSH REFLEX: 1.12 UIU/ML (ref 0.27–4.2)

## 2019-09-06 ENCOUNTER — OFFICE VISIT (OUTPATIENT)
Dept: CARDIOLOGY CLINIC | Age: 64
End: 2019-09-06
Payer: COMMERCIAL

## 2019-09-06 VITALS
HEART RATE: 53 BPM | DIASTOLIC BLOOD PRESSURE: 68 MMHG | SYSTOLIC BLOOD PRESSURE: 100 MMHG | OXYGEN SATURATION: 98 % | WEIGHT: 226.4 LBS | HEIGHT: 73 IN | BODY MASS INDEX: 30 KG/M2

## 2019-09-06 DIAGNOSIS — I48.19 PERSISTENT ATRIAL FIBRILLATION (HCC): Primary | ICD-10-CM

## 2019-09-06 DIAGNOSIS — I63.9 CEREBROVASCULAR ACCIDENT (CVA), UNSPECIFIED MECHANISM (HCC): ICD-10-CM

## 2019-09-06 PROCEDURE — 99214 OFFICE O/P EST MOD 30 MIN: CPT | Performed by: INTERNAL MEDICINE

## 2019-09-06 RX ORDER — ROSUVASTATIN CALCIUM 5 MG/1
5 TABLET, COATED ORAL DAILY
COMMUNITY
End: 2019-11-08

## 2019-09-06 RX ORDER — DILTIAZEM HYDROCHLORIDE 120 MG/1
120 CAPSULE, COATED, EXTENDED RELEASE ORAL DAILY
Qty: 30 CAPSULE | Refills: 5 | Status: SHIPPED | OUTPATIENT
Start: 2019-09-06 | End: 2020-02-19 | Stop reason: SDUPTHER

## 2019-09-06 RX ORDER — OMEPRAZOLE 40 MG/1
40 CAPSULE, DELAYED RELEASE ORAL DAILY
COMMUNITY
End: 2019-11-08

## 2019-09-06 NOTE — LETTER
(08/23/2017); Upper gastrointestinal endoscopy (11/06/2017); Vasectomy; Mouth surgery; Elbow surgery (Left); and pr decompress forearm,excis musc/nerv (Left, 12/24/2018). Allergies:  Latex and Sulfa antibiotics    Social History:   reports that he quit smoking about 10 years ago. His smoking use included cigarettes. He has a 38.00 pack-year smoking history. He has quit using smokeless tobacco. He reports that he drinks about 2.0 - 3.0 standard drinks of alcohol per week. He reports that he does not use drugs. Family History: family history includes Alcohol Abuse in his brother; Arthritis in his mother; Cancer (age of onset: 66) in his father; Heart Disease in his mother; High Blood Pressure in his mother. Home Medications:    Prior to Admission medications    Medication Sig Start Date End Date Taking?  Authorizing Provider   omeprazole (PRILOSEC) 40 MG delayed release capsule Take 40 mg by mouth daily   Yes Historical Provider, MD   multivitamin-iron-minerals-folic acid (CENTRUM) chewable tablet Take 1 tablet by mouth daily   Yes Historical Provider, MD   diltiazem (CARDIZEM CD) 180 MG extended release capsule Take 1 capsule by mouth daily 7/24/19  Yes SILVIO Perez CNP   levothyroxine (SYNTHROID) 75 MCG tablet Take 1 tablet by mouth Daily 7/23/19  Yes Teresa Partida MD   apixaban (ELIQUIS) 5 MG TABS tablet Take 5 mg by mouth 7/1/19  Yes Historical Provider, MD   minocycline (MINOCIN;DYNACIN) 100 MG capsule Take 1 capsule by mouth daily 5/10/19  Yes Teresa Partida MD   Azelaic Acid 15 % GEL Apply 1 drop topically See Admin Instructions 5/10/19  Yes Teresa Partida MD   pantoprazole (PROTONIX) 40 MG tablet  11/28/18  Yes Historical Provider, MD   fluticasone (FLONASE) 50 MCG/ACT nasal spray 1 spray by Nasal route daily   Yes Historical Provider, MD   rosuvastatin (CRESTOR) 5 MG tablet Take 5 mg by mouth daily    Historical Provider, MD atorvastatin (LIPITOR) 10 MG tablet Take 10 mg by mouth daily    Historical Provider, MD          REVIEW OF SYSTEMS:      All 14-point review of systems are completed and  pertinent positives are mentioned in the history of present illness. Other  systems are reviewed and are negative. Physical Examination:    /68 (Position: Standing)   Pulse 53   Ht 6' 1\" (1.854 m)   Wt 226 lb 6.4 oz (102.7 kg)   SpO2 98%   BMI 29.87 kg/m²       Constitutional and General Appearance:    alert, cooperative, no distress and appears stated age  [de-identified]:    PERRLA, no cervical lymphadenopathy. No masses palpable. Normal oral mucosa  Respiratory:  · Normal excursion and expansion without use of accessory muscles  · Resp Auscultation: Normal breath sounds without dullness or wheezing  Cardiovascular:  · The apical impulse is not displaced  · Heart tones are crisp and normal. regular S1 and S2.  · Jugular venous pulsation Normal  · The carotid upstroke is normal in amplitude and contour without delay or bruit  · Peripheral pulses are symmetrical and full  Abdomen:  · No masses or tenderness  · Bowel sounds present  Extremities:  ·  No Cyanosis or Clubbing  ·  Lower extremity edema: No  · Skin: Warm and dry  Neurological:  · Alert and oriented. · Moves all extremities well  · No abnormalities of mood, affect, memory, mentation, or behavior are noted      DATA:    ECG: SR 66  ECHO: 8/2018   Conclusions      Summary   Normal left ventricle systolic function with an estimated ejection fraction   of 55%. No regional wall motion abnormalities are seen. Normal left ventricular diastolic filling pressure. The right ventricle appears mildly dilated with normal function. Mild bi-atrial enlargement. Mild mitral and tricuspid regurgitation. Systolic pulmonary artery pressure (SPAP) is normal and estimated at 26 mmHg   (right atrial pressure 3 mmHg).       LV Diastolic Dimension: 8.86 cm LV Systolic Dimension: 2.13 cm LV Septum Diastolic: 0.8 cm   LV PW Diastolic: 0.9 cm         AO Root Dimension: 3.2 cm                                   AV Cusp Separation: 2 cm                                   LA Dimension: 3.9 cm                                   LA Area: 26.9 cm2                                   LA volume/Index: 84.75 ml /38 ml/m2       [id number] Score for Atrial Fibrillation Stroke Risk   Risk   Factors  Component Value   C CHF No 0   H HTN No 0   A2 Age >= 76 No,  (65 y.o.) 0   D DM No 0   S2 Prior Stroke/TIA No 1   V Vascular Disease No 0   A Age 74-69 Yes,  (65 y.o.) 1   Sc Sex male 0    [id number]  Score  2       Click here for a link to the UpToDate guideline \"Atrial Fibrillation: Anticoagulation therapy to prevent embolization    Disclaimer: Risk Score calculation is dependent on accuracy of patient problem list and past encounter diagnosis. IMPRESSION:    1. Paroxysmal atrial fibrillation  - noted on event Monitor 10/2018 - [id number] score 3 (CVA, Age)   2. Ischemic CVA: received TPA 6/28/2019              -s/p partial thrombectomy               -small petechial bleed noted 7/2019, anticoagulation approved by neurosurgery     3. Dizziness    RECOMMENDATIONS:  1. Decrease the Cardizem to 120 mg to see if dizziness improves    ~call our office on Tuesday to let us know if the dizziness is better  2. Continue to monitor heart rate and keep a log  3. Follow up in 3 months with IVY Vu     Orthostatic normal. Will refer to neurology if dizziness does not improve with decrease dose of Cardizem    QUALITY MEASURES  1. Tobacco Cessation Counseling: NA  2. Retake of BP if >140/90:   NA  3. Documentation to PCP/referring for new patient:  Sent to PCP at close of office visit  4. CAD patient on anti-platelet: NA  5. CAD patient on STATIN therapy:  NA  6. Patient aFib on anticoagulation:  Yes, ASA       All questions and concerns were addressed to the patient/family. Alternatives to my treatment were discussed. This note was scribed in the presence of Dr. Maru Doyle MD by Sari Robin RN.     I, Ligia Monday, personally performed the services described in this documentation, as scribed by the above signed scribe in my presence. It is both accurate and complete to my knowledge. I agree with the details independently gathered by the clinical support staff, while the remaining scribed note accurately describes my personal service to the patient.      VIDAL Ledbetter F.A.C.C. [facility]  [place] [address]. [address] [place] [address]. [address].   [place], [address]  Phone: [phone]  Fax: [phone]

## 2019-09-10 ENCOUNTER — TELEPHONE (OUTPATIENT)
Dept: CARDIOLOGY CLINIC | Age: 64
End: 2019-09-10

## 2019-09-10 NOTE — TELEPHONE ENCOUNTER
Rps wanted pt to call with update since changing Cardizem. Pt sts dizzy spells have deminished greatly.   9/8 B/P 152/98 pulse 57  9/9 B/P 132/87 pulse 59  9/10 B/P 150/83 pulse 72

## 2019-09-11 ENCOUNTER — TELEPHONE (OUTPATIENT)
Dept: PULMONOLOGY | Age: 64
End: 2019-09-11

## 2019-09-11 DIAGNOSIS — G47.33 OBSTRUCTIVE SLEEP APNEA SYNDROME: Primary | ICD-10-CM

## 2019-09-11 NOTE — TELEPHONE ENCOUNTER
Gregory from [employer] called stating that pt is requesting a travel CPAP and [facility] requesting order with current settings to be faxed to [phone]. Order pending for review. Needs to be faxed. OV 7/30/19:    Assessment:       · Severe REINIER. Auto CPAP 11-16 CM H2O. suboptimal compliance on review today  · Hypersomnia-worse since stroke  · Snoring- very mild with CPAP  · Sleep maintenance insomnia-poor sleep hygiene, suboptimal treatment of sleep apnea, comorbid conditions, recent stroke likely contributing     Plan:       -Send order to change to CPAP 13-17 cm H2O  -Please bring CPAP to office since  -Follow AHI and adjust pressure if needed  - Advised to use CPAP 6-8 hrs at night and during naps-need to increase use. - Replacement of mask, tubing, head straps every 3-6 months or sooner if damaged. - Patient instructed to contact DME company for any mask, tubing or machine trouble shooting if problems arise.  - Sleep hygiene  -Recommend set bed/wake times, no naps in the daytime, wear CPAP all night can just disconnect tubing when going to the bathroom if having a hard time getting mask back on  - Avoid sedatives, alcohol and caffeinated drinks at bed time. - Patient counseled to never drive or operate heavy machinery while fatigue, drowsy or sleepy.    - Weight loss is recommended as a long-term intervention.    - Complications of REINIER if not treated were discussed with patient patient, including: systemic hypertension, pulmonary hypertension, cardiovascular morbidities, car accidents and all cause mortality.  -Patient education handout provided regarding sleep tips and CPAP cleaning recommendations      Follow up: 3 months, sooner if needed

## 2019-09-17 ENCOUNTER — TELEPHONE (OUTPATIENT)
Dept: FAMILY MEDICINE CLINIC | Age: 64
End: 2019-09-17

## 2019-09-17 DIAGNOSIS — Z87.898 H/O MOTION SICKNESS: Primary | ICD-10-CM

## 2019-09-17 RX ORDER — SCOLOPAMINE TRANSDERMAL SYSTEM 1 MG/1
1 PATCH, EXTENDED RELEASE TRANSDERMAL
Qty: 4 PATCH | Refills: 0 | Status: SHIPPED | OUTPATIENT
Start: 2019-09-17 | End: 2019-09-24

## 2019-09-17 NOTE — TELEPHONE ENCOUNTER
Patient wife Tashia Brooks called and stated they will be going on a matti soon and would like to know if you can call in the sea sick patch for Jessica Banuelos. The pharmacy she would like to use is the [facility].

## 2019-09-30 DIAGNOSIS — L71.9 ROSACEA: ICD-10-CM

## 2019-09-30 RX ORDER — MINOCYCLINE HYDROCHLORIDE 100 MG/1
100 CAPSULE ORAL DAILY
Qty: 90 CAPSULE | Refills: 1 | Status: SHIPPED | OUTPATIENT
Start: 2019-09-30 | End: 2019-12-03 | Stop reason: SDUPTHER

## 2019-10-17 ENCOUNTER — TELEPHONE (OUTPATIENT)
Dept: PULMONOLOGY | Age: 64
End: 2019-10-17

## 2019-10-28 RX ORDER — APIXABAN 5 MG/1
TABLET, FILM COATED ORAL
Qty: 60 TABLET | Refills: 8 | Status: SHIPPED | OUTPATIENT
Start: 2019-10-28 | End: 2020-06-01

## 2019-11-08 ENCOUNTER — OFFICE VISIT (OUTPATIENT)
Dept: FAMILY MEDICINE CLINIC | Age: 64
End: 2019-11-08
Payer: MEDICARE

## 2019-11-08 ENCOUNTER — TELEPHONE (OUTPATIENT)
Dept: FAMILY MEDICINE CLINIC | Age: 64
End: 2019-11-08

## 2019-11-08 VITALS
SYSTOLIC BLOOD PRESSURE: 126 MMHG | OXYGEN SATURATION: 98 % | WEIGHT: 229.8 LBS | TEMPERATURE: 97.9 F | DIASTOLIC BLOOD PRESSURE: 74 MMHG | BODY MASS INDEX: 30.32 KG/M2 | HEART RATE: 68 BPM

## 2019-11-08 DIAGNOSIS — E78.5 HYPERLIPIDEMIA, UNSPECIFIED HYPERLIPIDEMIA TYPE: ICD-10-CM

## 2019-11-08 DIAGNOSIS — K21.9 GASTROESOPHAGEAL REFLUX DISEASE, ESOPHAGITIS PRESENCE NOT SPECIFIED: ICD-10-CM

## 2019-11-08 DIAGNOSIS — E78.5 HYPERLIPIDEMIA, UNSPECIFIED HYPERLIPIDEMIA TYPE: Primary | ICD-10-CM

## 2019-11-08 DIAGNOSIS — I48.91 ATRIAL FIBRILLATION, UNSPECIFIED TYPE (HCC): ICD-10-CM

## 2019-11-08 DIAGNOSIS — E03.9 HYPOTHYROIDISM, UNSPECIFIED TYPE: Primary | ICD-10-CM

## 2019-11-08 DIAGNOSIS — R73.9 HYPERGLYCEMIA: ICD-10-CM

## 2019-11-08 LAB
A/G RATIO: 3.4 (ref 1.1–2.2)
ALBUMIN SERPL-MCNC: 4.8 G/DL (ref 3.4–5)
ALP BLD-CCNC: 53 U/L (ref 40–129)
ALT SERPL-CCNC: 21 U/L (ref 10–40)
ANION GAP SERPL CALCULATED.3IONS-SCNC: 12 MMOL/L (ref 3–16)
AST SERPL-CCNC: 19 U/L (ref 15–37)
BILIRUB SERPL-MCNC: 0.4 MG/DL (ref 0–1)
BUN BLDV-MCNC: 28 MG/DL (ref 7–20)
CALCIUM SERPL-MCNC: 9.7 MG/DL (ref 8.3–10.6)
CHLORIDE BLD-SCNC: 106 MMOL/L (ref 99–110)
CHOLESTEROL, TOTAL: 112 MG/DL (ref 0–199)
CO2: 24 MMOL/L (ref 21–32)
CREAT SERPL-MCNC: 1.2 MG/DL (ref 0.8–1.3)
GFR AFRICAN AMERICAN: >60
GFR NON-AFRICAN AMERICAN: >60
GLOBULIN: 1.4 G/DL
GLUCOSE BLD-MCNC: 104 MG/DL (ref 70–99)
HDLC SERPL-MCNC: 42 MG/DL (ref 40–60)
LDL CHOLESTEROL CALCULATED: 52 MG/DL
POTASSIUM SERPL-SCNC: 4.6 MMOL/L (ref 3.5–5.1)
SODIUM BLD-SCNC: 142 MMOL/L (ref 136–145)
TOTAL PROTEIN: 6.2 G/DL (ref 6.4–8.2)
TRIGL SERPL-MCNC: 88 MG/DL (ref 0–150)
VLDLC SERPL CALC-MCNC: 18 MG/DL

## 2019-11-08 PROCEDURE — G8482 FLU IMMUNIZE ORDER/ADMIN: HCPCS | Performed by: FAMILY MEDICINE

## 2019-11-08 PROCEDURE — 1036F TOBACCO NON-USER: CPT | Performed by: FAMILY MEDICINE

## 2019-11-08 PROCEDURE — G8598 ASA/ANTIPLAT THER USED: HCPCS | Performed by: FAMILY MEDICINE

## 2019-11-08 PROCEDURE — G8417 CALC BMI ABV UP PARAM F/U: HCPCS | Performed by: FAMILY MEDICINE

## 2019-11-08 PROCEDURE — 99214 OFFICE O/P EST MOD 30 MIN: CPT | Performed by: FAMILY MEDICINE

## 2019-11-08 PROCEDURE — 4040F PNEUMOC VAC/ADMIN/RCVD: CPT | Performed by: FAMILY MEDICINE

## 2019-11-08 PROCEDURE — 1123F ACP DISCUSS/DSCN MKR DOCD: CPT | Performed by: FAMILY MEDICINE

## 2019-11-08 PROCEDURE — 3017F COLORECTAL CA SCREEN DOC REV: CPT | Performed by: FAMILY MEDICINE

## 2019-11-08 PROCEDURE — 36415 COLL VENOUS BLD VENIPUNCTURE: CPT | Performed by: FAMILY MEDICINE

## 2019-11-08 PROCEDURE — G8427 DOCREV CUR MEDS BY ELIG CLIN: HCPCS | Performed by: FAMILY MEDICINE

## 2019-11-08 RX ORDER — ATORVASTATIN CALCIUM 80 MG/1
80 TABLET, FILM COATED ORAL DAILY
Qty: 90 TABLET | Refills: 1
Start: 2019-11-08 | End: 2019-12-10 | Stop reason: SDUPTHER

## 2019-11-08 RX ORDER — OMEPRAZOLE 20 MG/1
20 CAPSULE, DELAYED RELEASE ORAL DAILY
Qty: 90 CAPSULE | Refills: 1
Start: 2019-11-08

## 2019-11-08 ASSESSMENT — ENCOUNTER SYMPTOMS: SHORTNESS OF BREATH: 0

## 2019-11-09 LAB
ESTIMATED AVERAGE GLUCOSE: 116.9 MG/DL
HBA1C MFR BLD: 5.7 %

## 2019-11-10 PROBLEM — R73.03 PRE-DIABETES: Status: ACTIVE | Noted: 2019-11-10

## 2019-11-14 ENCOUNTER — TELEPHONE (OUTPATIENT)
Dept: PULMONOLOGY | Age: 64
End: 2019-11-14

## 2019-11-14 ENCOUNTER — OFFICE VISIT (OUTPATIENT)
Dept: PULMONOLOGY | Age: 64
End: 2019-11-14
Payer: MEDICARE

## 2019-11-14 VITALS
OXYGEN SATURATION: 98 % | WEIGHT: 226 LBS | HEART RATE: 70 BPM | SYSTOLIC BLOOD PRESSURE: 113 MMHG | DIASTOLIC BLOOD PRESSURE: 65 MMHG | HEIGHT: 71 IN | BODY MASS INDEX: 31.64 KG/M2 | RESPIRATION RATE: 16 BRPM

## 2019-11-14 DIAGNOSIS — F51.04 PSYCHOPHYSIOLOGICAL INSOMNIA: ICD-10-CM

## 2019-11-14 DIAGNOSIS — G47.10 HYPERSOMNIA: ICD-10-CM

## 2019-11-14 DIAGNOSIS — Z72.821 POOR SLEEP HYGIENE: ICD-10-CM

## 2019-11-14 DIAGNOSIS — G47.33 SEVERE OBSTRUCTIVE SLEEP APNEA: Primary | ICD-10-CM

## 2019-11-14 DIAGNOSIS — Z71.89 CPAP USE COUNSELING: ICD-10-CM

## 2019-11-14 PROCEDURE — 3017F COLORECTAL CA SCREEN DOC REV: CPT | Performed by: NURSE PRACTITIONER

## 2019-11-14 PROCEDURE — 1036F TOBACCO NON-USER: CPT | Performed by: NURSE PRACTITIONER

## 2019-11-14 PROCEDURE — G8417 CALC BMI ABV UP PARAM F/U: HCPCS | Performed by: NURSE PRACTITIONER

## 2019-11-14 PROCEDURE — 1123F ACP DISCUSS/DSCN MKR DOCD: CPT | Performed by: NURSE PRACTITIONER

## 2019-11-14 PROCEDURE — 4040F PNEUMOC VAC/ADMIN/RCVD: CPT | Performed by: NURSE PRACTITIONER

## 2019-11-14 PROCEDURE — G8427 DOCREV CUR MEDS BY ELIG CLIN: HCPCS | Performed by: NURSE PRACTITIONER

## 2019-11-14 PROCEDURE — G8598 ASA/ANTIPLAT THER USED: HCPCS | Performed by: NURSE PRACTITIONER

## 2019-11-14 PROCEDURE — 99214 OFFICE O/P EST MOD 30 MIN: CPT | Performed by: NURSE PRACTITIONER

## 2019-11-14 PROCEDURE — G8482 FLU IMMUNIZE ORDER/ADMIN: HCPCS | Performed by: NURSE PRACTITIONER

## 2019-11-14 ASSESSMENT — SLEEP AND FATIGUE QUESTIONNAIRES
HOW LIKELY ARE YOU TO NOD OFF OR FALL ASLEEP WHILE SITTING AND TALKING TO SOMEONE: 0
HOW LIKELY ARE YOU TO NOD OFF OR FALL ASLEEP IN A CAR, WHILE STOPPED FOR A FEW MINUTES IN TRAFFIC: 0
HOW LIKELY ARE YOU TO NOD OFF OR FALL ASLEEP WHEN YOU ARE A PASSENGER IN A CAR FOR AN HOUR WITHOUT A BREAK: 3
HOW LIKELY ARE YOU TO NOD OFF OR FALL ASLEEP WHILE WATCHING TV: 3
HOW LIKELY ARE YOU TO NOD OFF OR FALL ASLEEP WHILE SITTING AND READING: 3
HOW LIKELY ARE YOU TO NOD OFF OR FALL ASLEEP WHEN YOU ARE A PASSENGER IN A CAR FOR AN HOUR WITHOUT A BREAK: 3
HOW LIKELY ARE YOU TO NOD OFF OR FALL ASLEEP WHILE SITTING QUIETLY AFTER LUNCH WITHOUT ALCOHOL: 3
HOW LIKELY ARE YOU TO NOD OFF OR FALL ASLEEP WHILE LYING DOWN TO REST IN THE AFTERNOON WHEN CIRCUMSTANCES PERMIT: 3
HOW LIKELY ARE YOU TO NOD OFF OR FALL ASLEEP WHILE WATCHING TV: 2
HOW LIKELY ARE YOU TO NOD OFF OR FALL ASLEEP WHILE SITTING INACTIVE IN A PUBLIC PLACE: 2
HOW LIKELY ARE YOU TO NOD OFF OR FALL ASLEEP WHILE LYING DOWN TO REST IN THE AFTERNOON WHEN CIRCUMSTANCES PERMIT: 3
ESS TOTAL SCORE: 15
HOW LIKELY ARE YOU TO NOD OFF OR FALL ASLEEP WHILE SITTING AND READING: 3
HOW LIKELY ARE YOU TO NOD OFF OR FALL ASLEEP IN A CAR, WHILE STOPPED FOR A FEW MINUTES IN TRAFFIC: 0
HOW LIKELY ARE YOU TO NOD OFF OR FALL ASLEEP WHILE SITTING AND TALKING TO SOMEONE: 1
ESS TOTAL SCORE: 18
HOW LIKELY ARE YOU TO NOD OFF OR FALL ASLEEP WHILE SITTING QUIETLY AFTER LUNCH WITHOUT ALCOHOL: 3
NECK CIRCUMFERENCE (INCHES): 16.5
HOW LIKELY ARE YOU TO NOD OFF OR FALL ASLEEP WHILE SITTING INACTIVE IN A PUBLIC PLACE: 1

## 2019-12-03 DIAGNOSIS — L71.9 ROSACEA: ICD-10-CM

## 2019-12-03 DIAGNOSIS — E03.9 HYPOTHYROIDISM, UNSPECIFIED TYPE: ICD-10-CM

## 2019-12-03 RX ORDER — MINOCYCLINE HYDROCHLORIDE 100 MG/1
100 CAPSULE ORAL DAILY
Qty: 90 CAPSULE | Refills: 1 | Status: SHIPPED | OUTPATIENT
Start: 2019-12-03 | End: 2020-03-17 | Stop reason: SDUPTHER

## 2019-12-03 RX ORDER — LEVOTHYROXINE SODIUM 0.07 MG/1
75 TABLET ORAL DAILY
Qty: 90 TABLET | Refills: 1 | Status: SHIPPED | OUTPATIENT
Start: 2019-12-03 | End: 2020-05-26

## 2019-12-10 ENCOUNTER — OFFICE VISIT (OUTPATIENT)
Dept: CARDIOLOGY CLINIC | Age: 64
End: 2019-12-10
Payer: MEDICARE

## 2019-12-10 VITALS
SYSTOLIC BLOOD PRESSURE: 126 MMHG | DIASTOLIC BLOOD PRESSURE: 84 MMHG | HEIGHT: 71 IN | HEART RATE: 72 BPM | BODY MASS INDEX: 31.36 KG/M2 | WEIGHT: 224 LBS

## 2019-12-10 DIAGNOSIS — E78.5 HYPERLIPIDEMIA, UNSPECIFIED HYPERLIPIDEMIA TYPE: ICD-10-CM

## 2019-12-10 DIAGNOSIS — I48.11 LONGSTANDING PERSISTENT ATRIAL FIBRILLATION (HCC): Primary | ICD-10-CM

## 2019-12-10 PROCEDURE — 1123F ACP DISCUSS/DSCN MKR DOCD: CPT | Performed by: NURSE PRACTITIONER

## 2019-12-10 PROCEDURE — G8598 ASA/ANTIPLAT THER USED: HCPCS | Performed by: NURSE PRACTITIONER

## 2019-12-10 PROCEDURE — 99213 OFFICE O/P EST LOW 20 MIN: CPT | Performed by: NURSE PRACTITIONER

## 2019-12-10 PROCEDURE — 1036F TOBACCO NON-USER: CPT | Performed by: NURSE PRACTITIONER

## 2019-12-10 PROCEDURE — 4040F PNEUMOC VAC/ADMIN/RCVD: CPT | Performed by: NURSE PRACTITIONER

## 2019-12-10 PROCEDURE — G8417 CALC BMI ABV UP PARAM F/U: HCPCS | Performed by: NURSE PRACTITIONER

## 2019-12-10 PROCEDURE — 93000 ELECTROCARDIOGRAM COMPLETE: CPT | Performed by: NURSE PRACTITIONER

## 2019-12-10 PROCEDURE — G8482 FLU IMMUNIZE ORDER/ADMIN: HCPCS | Performed by: NURSE PRACTITIONER

## 2019-12-10 PROCEDURE — 3017F COLORECTAL CA SCREEN DOC REV: CPT | Performed by: NURSE PRACTITIONER

## 2019-12-10 PROCEDURE — G8427 DOCREV CUR MEDS BY ELIG CLIN: HCPCS | Performed by: NURSE PRACTITIONER

## 2019-12-10 RX ORDER — ATORVASTATIN CALCIUM 80 MG/1
80 TABLET, FILM COATED ORAL DAILY
Qty: 90 TABLET | Refills: 1 | Status: SHIPPED | OUTPATIENT
Start: 2019-12-10 | End: 2020-06-04

## 2020-01-08 ENCOUNTER — TELEPHONE (OUTPATIENT)
Dept: PULMONOLOGY | Age: 65
End: 2020-01-08

## 2020-01-08 NOTE — TELEPHONE ENCOUNTER
Letter of Medical necessity ready for  @ [facility] office letter scanned into Epic.  Pt informed

## 2020-02-19 RX ORDER — DILTIAZEM HYDROCHLORIDE 120 MG/1
120 CAPSULE, COATED, EXTENDED RELEASE ORAL DAILY
Qty: 90 CAPSULE | Refills: 2 | Status: SHIPPED | OUTPATIENT
Start: 2020-02-19 | End: 2020-10-28 | Stop reason: SDUPTHER

## 2020-03-17 RX ORDER — MINOCYCLINE HYDROCHLORIDE 100 MG/1
100 CAPSULE ORAL DAILY
Qty: 90 CAPSULE | Refills: 1 | Status: SHIPPED | OUTPATIENT
Start: 2020-03-17 | End: 2020-08-24

## 2020-05-01 ENCOUNTER — VIRTUAL VISIT (OUTPATIENT)
Dept: FAMILY MEDICINE CLINIC | Age: 65
End: 2020-05-01
Payer: MEDICARE

## 2020-05-01 VITALS — HEIGHT: 72 IN | BODY MASS INDEX: 29.85 KG/M2 | WEIGHT: 220.4 LBS

## 2020-05-01 PROCEDURE — G8427 DOCREV CUR MEDS BY ELIG CLIN: HCPCS | Performed by: FAMILY MEDICINE

## 2020-05-01 PROCEDURE — 4040F PNEUMOC VAC/ADMIN/RCVD: CPT | Performed by: FAMILY MEDICINE

## 2020-05-01 PROCEDURE — 1123F ACP DISCUSS/DSCN MKR DOCD: CPT | Performed by: FAMILY MEDICINE

## 2020-05-01 PROCEDURE — 3017F COLORECTAL CA SCREEN DOC REV: CPT | Performed by: FAMILY MEDICINE

## 2020-05-01 PROCEDURE — 99214 OFFICE O/P EST MOD 30 MIN: CPT | Performed by: FAMILY MEDICINE

## 2020-05-01 ASSESSMENT — PATIENT HEALTH QUESTIONNAIRE - PHQ9
SUM OF ALL RESPONSES TO PHQ QUESTIONS 1-9: 0
1. LITTLE INTEREST OR PLEASURE IN DOING THINGS: 0
SUM OF ALL RESPONSES TO PHQ9 QUESTIONS 1 & 2: 0
2. FEELING DOWN, DEPRESSED OR HOPELESS: 0
SUM OF ALL RESPONSES TO PHQ QUESTIONS 1-9: 0

## 2020-05-01 ASSESSMENT — ENCOUNTER SYMPTOMS: SHORTNESS OF BREATH: 0

## 2020-05-01 NOTE — PROGRESS NOTES
2020    TELEHEALTH EVALUATION -- Audio/Visual (During OXSUL-20 public health emergency)    HPI:    Annette Gillespie (:  1955) has requested an audio/video evaluation for the following concern(s):    He is feeling well and has no acute complaints.   Hx of Hypothyroidism. Reports compliance with medications. Last TSH 1.12 2019 wnl  Hx of atrial fibrillation follows with cardiology. Reports compliance with Cardizem and eliquis without SE. Hx of REINIER compliant with cpap. Follows with sleep medicine  Hx of GERD on prilosec 20mg Follows with Dr. Hank Diaz in GI.   Hx of Rosacea on minocycline and azelaic acid doing well. Hx of HLD on lipitor 80mg reports compliance without medication SE    Review of Systems   Constitutional: Negative for chills and fever. Respiratory: Negative for shortness of breath. Cardiovascular: Negative for chest pain. Prior to Visit Medications    Medication Sig Taking?  Authorizing Provider   minocycline (MINOCIN;DYNACIN) 100 MG capsule Take 1 capsule by mouth daily Yes Stewart Beckett MD   dilTIAZem (CARDIZEM CD) 120 MG extended release capsule Take 1 capsule by mouth daily Yes SILVIO Oliveira CNP   atorvastatin (LIPITOR) 80 MG tablet Take 1 tablet by mouth daily Yes Stewart Beckett MD   levothyroxine (SYNTHROID) 75 MCG tablet Take 1 tablet by mouth Daily Yes Stewart Beckett MD   omeprazole (PRILOSEC) 20 MG delayed release capsule Take 1 capsule by mouth daily Yes Stewart Beckett MD   ELIQUIS 5 MG TABS tablet TAKE 1 TABLET BY MOUTH TWICE DAILY Yes SILVIO Oliveira CNP   multivitamin-iron-minerals-folic acid (CENTRUM) chewable tablet Take 1 tablet by mouth daily Yes Historical Provider, MD   Azelaic Acid 15 % GEL Apply 1 drop topically See Admin Instructions Yes Stewart Beckett MD   fluticasone (FLONASE) 50 MCG/ACT nasal spray 1 spray by Nasal route daily Yes Historical Provider, MD       Social History     Tobacco Use    Smoking status: Former

## 2020-05-26 RX ORDER — LEVOTHYROXINE SODIUM 0.07 MG/1
TABLET ORAL
Qty: 90 TABLET | Refills: 1 | Status: SHIPPED | OUTPATIENT
Start: 2020-05-26 | End: 2020-11-13 | Stop reason: SDUPTHER

## 2020-06-01 RX ORDER — APIXABAN 5 MG/1
TABLET, FILM COATED ORAL
Qty: 30 TABLET | Refills: 1 | Status: SHIPPED | OUTPATIENT
Start: 2020-06-01 | End: 2020-09-11 | Stop reason: SDUPTHER

## 2020-06-01 NOTE — TELEPHONE ENCOUNTER
12/10/2020 NPBB   Assessment:   1. Persistent atrial fibrillation: stable, asymptomatic               -fatigue with metoprolol              -[id number] score 2 (CVA)  2. Ischemic CVA: received TPA 6/28/2019              -s/p partial thrombectomy               -small petechial bleed noted 7/2019, anticoagulation approved by neurosurgery   3. Hypothyroidism: on Synthroid  4. REINIER: reports compliance with CPAP     Plan:   1. Continue Eliquis and Cardizem   2. Annual BMP (due 11/2020) and CBC (due 7/2020)   3.  Follow up in 6 months

## 2020-06-04 RX ORDER — ATORVASTATIN CALCIUM 80 MG/1
TABLET, FILM COATED ORAL
Qty: 90 TABLET | Refills: 1 | Status: SHIPPED | OUTPATIENT
Start: 2020-06-04 | End: 2020-08-27 | Stop reason: SDUPTHER

## 2020-06-08 ENCOUNTER — HOSPITAL ENCOUNTER (OUTPATIENT)
Dept: CT IMAGING | Age: 65
Discharge: HOME | End: 2020-06-08
Payer: MEDICARE

## 2020-06-08 PROCEDURE — G0297 LDCT FOR LUNG CA SCREEN: HCPCS

## 2020-06-11 ENCOUNTER — OFFICE VISIT (OUTPATIENT)
Dept: CARDIOLOGY CLINIC | Age: 65
End: 2020-06-11
Payer: MEDICARE

## 2020-06-11 VITALS
OXYGEN SATURATION: 98 % | HEART RATE: 78 BPM | SYSTOLIC BLOOD PRESSURE: 126 MMHG | WEIGHT: 225 LBS | DIASTOLIC BLOOD PRESSURE: 70 MMHG | BODY MASS INDEX: 30.48 KG/M2 | HEIGHT: 72 IN

## 2020-06-11 PROCEDURE — 1036F TOBACCO NON-USER: CPT | Performed by: NURSE PRACTITIONER

## 2020-06-11 PROCEDURE — 4040F PNEUMOC VAC/ADMIN/RCVD: CPT | Performed by: NURSE PRACTITIONER

## 2020-06-11 PROCEDURE — 1123F ACP DISCUSS/DSCN MKR DOCD: CPT | Performed by: NURSE PRACTITIONER

## 2020-06-11 PROCEDURE — 99213 OFFICE O/P EST LOW 20 MIN: CPT | Performed by: NURSE PRACTITIONER

## 2020-06-11 PROCEDURE — 93000 ELECTROCARDIOGRAM COMPLETE: CPT | Performed by: NURSE PRACTITIONER

## 2020-06-11 PROCEDURE — G8427 DOCREV CUR MEDS BY ELIG CLIN: HCPCS | Performed by: NURSE PRACTITIONER

## 2020-06-11 PROCEDURE — 3017F COLORECTAL CA SCREEN DOC REV: CPT | Performed by: NURSE PRACTITIONER

## 2020-06-11 PROCEDURE — G8417 CALC BMI ABV UP PARAM F/U: HCPCS | Performed by: NURSE PRACTITIONER

## 2020-06-11 NOTE — PROGRESS NOTES
Constitutional and general appearance: alert, cooperative, no distress and appears stated age  [de-identified]: PERRL, no cervical lymphadenopathy. No masses palpable. Normal oral mucosa  Respiratory:  · Normal excursion and expansion without use of accessory muscles  · Resp auscultation: Normal breath sounds without wheezing, rhonchi, and rales  Cardiovascular:  · The apical impulse is not displaced  · Heart tones are crisp and normal. Irregular S1 and S2.  · Jugular venous pulsation Normal  · The carotid upstroke is normal in amplitude and contour without delay or bruit  · Peripheral pulses are symmetrical and full   Abdomen:  · No masses or tenderness  · Bowel sounds present  Extremities:  ·  No cyanosis or clubbing  ·  No lower extremity edema  ·  Skin: warm and dry  Neurological:  · Alert and oriented  · Moves all extremities well  · Flat affect     DATA:    ECG 6/11/2020: Afib HR 69    Echo 8/17/2018:  Normal left ventricle systolic function with an estimated ejection fraction of 55%.   No regional wall motion abnormalities are seen.   Normal left ventricular diastolic filling pressure.   The right ventricle appears mildly dilated with normal function.   Mild bi-atrial enlargement.   Mild mitral and tricuspid regurgitation.   Systolic pulmonary artery pressure (SPAP) is normal and estimated at 26 mmHg   (right atrial pressure 3 mmHg). CARDIOLOGY LABS:   CBC: No results for input(s): WBC, HGB, HCT, PLT in the last 72 hours. BMP: No results for input(s): NA, K, CO2, BUN, CREATININE, LABGLOM, GLUCOSE in the last 72 hours. PT/INR: No results for input(s): PROTIME, INR in the last 72 hours. APTT:No results for input(s): APTT in the last 72 hours. FASTING LIPID PANEL:  Lab Results   Component Value Date    HDL 42 11/08/2019    LDLCALC 52 11/08/2019    TRIG 88 11/08/2019     LIVER PROFILE:No results for input(s): AST, ALT, ALB in the last 72 hours. Assessment:   1.  Persistent atrial fibrillation: stable, asymptomatic    -fatigue with metoprolol   -[id number] score 3 (CVA and age)  2. Ischemic CVA: received TPA 6/28/2019   -s/p partial thrombectomy    -small petechial bleed noted 7/2019, anticoagulation approved by neurosurgery   3. Hypothyroidism: on Synthroid  4. REINIER: reports compliance with CPAP    Plan:   1. Continue Eliquis and Cardizem   2. Annual BMP (due 11/2020) and CBC (due 7/2020)   3. He will require a Lovenox bridge for colonoscopy in 10/2020  4.  Follow up in one year     Aidee Ngo, [address]  [phone]

## 2020-07-01 ENCOUNTER — HOSPITAL ENCOUNTER (OUTPATIENT)
Age: 65
Discharge: HOME | End: 2020-07-01
Payer: MEDICARE

## 2020-07-01 LAB
ANION GAP SERPL CALCULATED.3IONS-SCNC: 9 MMOL/L (ref 3–16)
BASOPHILS ABSOLUTE: 0 K/UL (ref 0–0.2)
BASOPHILS RELATIVE PERCENT: 0.4 %
BUN BLDV-MCNC: 22 MG/DL (ref 7–20)
CALCIUM SERPL-MCNC: 9.8 MG/DL (ref 8.3–10.6)
CHLORIDE BLD-SCNC: 105 MMOL/L (ref 99–110)
CO2: 25 MMOL/L (ref 21–32)
CREAT SERPL-MCNC: 1.1 MG/DL (ref 0.8–1.3)
EOSINOPHILS ABSOLUTE: 0.3 K/UL (ref 0–0.6)
EOSINOPHILS RELATIVE PERCENT: 3.8 %
GFR AFRICAN AMERICAN: >60
GFR NON-AFRICAN AMERICAN: >60
GLUCOSE BLD-MCNC: 110 MG/DL (ref 70–99)
HCT VFR BLD CALC: 43.2 % (ref 40.5–52.5)
HEMOGLOBIN: 14.8 G/DL (ref 13.5–17.5)
LYMPHOCYTES ABSOLUTE: 2 K/UL (ref 1–5.1)
LYMPHOCYTES RELATIVE PERCENT: 27.2 %
MCH RBC QN AUTO: 32.3 PG (ref 26–34)
MCHC RBC AUTO-ENTMCNC: 34.1 G/DL (ref 31–36)
MCV RBC AUTO: 94.6 FL (ref 80–100)
MONOCYTES ABSOLUTE: 0.6 K/UL (ref 0–1.3)
MONOCYTES RELATIVE PERCENT: 8.6 %
NEUTROPHILS ABSOLUTE: 4.4 K/UL (ref 1.7–7.7)
NEUTROPHILS RELATIVE PERCENT: 60 %
PDW BLD-RTO: 13 % (ref 12.4–15.4)
PLATELET # BLD: 204 K/UL (ref 135–450)
PMV BLD AUTO: 9.8 FL (ref 5–10.5)
POTASSIUM SERPL-SCNC: 4.6 MMOL/L (ref 3.5–5.1)
RBC # BLD: 4.57 M/UL (ref 4.2–5.9)
SODIUM BLD-SCNC: 139 MMOL/L (ref 136–145)
WBC # BLD: 7.4 K/UL (ref 4–11)

## 2020-07-01 PROCEDURE — 80048 BASIC METABOLIC PNL TOTAL CA: CPT

## 2020-07-01 PROCEDURE — 36415 COLL VENOUS BLD VENIPUNCTURE: CPT

## 2020-07-01 PROCEDURE — 85025 COMPLETE CBC W/AUTO DIFF WBC: CPT

## 2020-08-11 ENCOUNTER — TELEPHONE (OUTPATIENT)
Dept: FAMILY MEDICINE CLINIC | Age: 65
End: 2020-08-11

## 2020-08-11 NOTE — TELEPHONE ENCOUNTER
----- Message from Humaira Austin sent at 8/11/2020 10:58 AM EDT -----  Subject: Appointment Request    Reason for Call: New Patient Request Appointment    QUESTIONS  Type of Appointment? New Patient/New to Provider  Reason for appointment request? No appointments available during search  Additional Information for Provider? Patient would like to get established   with some one in the office since Dr. Toby Whitlock is leaving he would like to   stay in the same office. Please advise  ---------------------------------------------------------------------------  --------------  CALL BACK INFO  What is the best way for the office to contact you? OK to leave message on   voicemail  Preferred Call Back Phone Number? [phone]  ---------------------------------------------------------------------------  --------------  SCRIPT ANSWERS  Relationship to Patient? Self  Appointment reason? Establish Care/Find a provider  Have you been diagnosed with   tested for   or told that you are suspected of having COVID-19 (Coronavirus)? No  Have you had a fever or taken medication to treat a fever within the past   3 days? No  Have you had a cough   shortness of breath or flu-like symptoms within the past 3 days? No  Do you currently have flu-like symptoms including fever or chills   cough   shortness of breath   or difficulty breathing   or new loss of taste or smell? No  (Service Expert  click yes below to proceed with aKthe Vud Chandrakant Cordovageethaoscar As Usual   Scheduling)?  Yes

## 2020-08-12 NOTE — TELEPHONE ENCOUNTER
I have lovenox pended for you to sign for 6 doses to take 3 days prior. Can you verify script and sign if this is correct or adjust order accordingly?

## 2020-08-17 NOTE — TELEPHONE ENCOUNTER
Pt sts he is having colonoscopy on /24. Pt is being bridge with Lovenox. Pt sts he was only given 3 days worth but needs 5. Please advise.

## 2020-08-19 NOTE — TELEPHONE ENCOUNTER
Dr Adrian Rader ordered for 3 days only on 8/12/2020. I have pended the other 2 days for you to sign  He needs to start today if you want 5 days of Lovenox before colonoscopy on 8/24/20. Pt called again to see what to do.

## 2020-08-21 ENCOUNTER — OFFICE VISIT (OUTPATIENT)
Dept: FAMILY MEDICINE CLINIC | Age: 65
End: 2020-08-21
Payer: MEDICARE

## 2020-08-21 VITALS
DIASTOLIC BLOOD PRESSURE: 72 MMHG | HEIGHT: 72 IN | TEMPERATURE: 95.9 F | SYSTOLIC BLOOD PRESSURE: 120 MMHG | WEIGHT: 225 LBS | OXYGEN SATURATION: 99 % | HEART RATE: 81 BPM | BODY MASS INDEX: 30.48 KG/M2

## 2020-08-21 LAB
PROSTATE SPECIFIC ANTIGEN: 0.53 NG/ML (ref 0–4)
TSH SERPL DL<=0.05 MIU/L-ACNC: 3.08 UIU/ML (ref 0.27–4.2)

## 2020-08-21 PROCEDURE — 1036F TOBACCO NON-USER: CPT | Performed by: FAMILY MEDICINE

## 2020-08-21 PROCEDURE — G8427 DOCREV CUR MEDS BY ELIG CLIN: HCPCS | Performed by: FAMILY MEDICINE

## 2020-08-21 PROCEDURE — G8417 CALC BMI ABV UP PARAM F/U: HCPCS | Performed by: FAMILY MEDICINE

## 2020-08-21 PROCEDURE — 4040F PNEUMOC VAC/ADMIN/RCVD: CPT | Performed by: FAMILY MEDICINE

## 2020-08-21 PROCEDURE — 36415 COLL VENOUS BLD VENIPUNCTURE: CPT | Performed by: FAMILY MEDICINE

## 2020-08-21 PROCEDURE — 3017F COLORECTAL CA SCREEN DOC REV: CPT | Performed by: FAMILY MEDICINE

## 2020-08-21 PROCEDURE — 99214 OFFICE O/P EST MOD 30 MIN: CPT | Performed by: FAMILY MEDICINE

## 2020-08-21 PROCEDURE — 1123F ACP DISCUSS/DSCN MKR DOCD: CPT | Performed by: FAMILY MEDICINE

## 2020-08-21 ASSESSMENT — ENCOUNTER SYMPTOMS: SHORTNESS OF BREATH: 0

## 2020-08-21 NOTE — PROGRESS NOTES
Chief Complaint   Patient presents with    Hyperlipidemia     3 month follow up for management of chronic conditions    Hypothyroidism    Gastroesophageal Reflux    Atrial Fibrillation    Sleep Apnea         HPI      76 y.o. male presents today for follow up. He is feeling well and has no acute complaints.   Hx of Hypothyroidism. Reports compliance with medications. Last TSH 1.12 8/2019 wnl  Hx of atrial fibrillation follows with cardiology. Reports compliance with Cardizem and eliquis without SE.   Hx of REINIER compliant with cpap. Follows with sleep medicine  Hx of GERD on prilosec 20mg Follows with Dr. Luana Garrett in GI.   Hx of Rosacea on minocycline and azelaic acid doing well.    Hx of HLD on lipitor 80mg reports compliance without medication SE      Patient Active Problem List   Diagnosis    Hypothyroidism    Gastroesophageal reflux disease    Severe obstructive sleep apnea    Longstanding persistent atrial fibrillation    CVA (cerebral vascular accident) ([address]    Pre-diabetes     Past Medical History:   Diagnosis Date    Arthritis     Cerebral artery occlusion with cerebral infarction ([address]     Chronic kidney disease     childhood nephritis    Rosacea     Sleep apnea     compliant with cpap    Thyroid disease        Past Surgical History:   Procedure Laterality Date    COLONOSCOPY  2012    COLONOSCOPY  08/23/2017    polyp    ELBOW SURGERY Left     MOUTH SURGERY      [place] DECOMPRESS FOREARM,EXCIS MUSC/NERV Left 12/24/2018    LEFT CUBITAL TUNNEL RELEASE performed by Duc Hackett MD at [address]  11/06/2017    VASECTOMY      WISDOM TOOTH EXTRACTION  2015     Most Recent Immunizations   Administered Date(s) Administered    Influenza Vaccine, unspecified formulation 10/16/2014    Influenza Virus Vaccine 10/06/2018    Influenza, High Dose (Fluzone 65 yrs and older) 09/20/2019    Influenza, Intradermal, Quadrivalent, Preservative Free Pack years: 38.00     Types: Cigarettes     Last attempt to quit: 2009     Years since quittin.6    Smokeless tobacco: Former User   Substance and Sexual Activity    Alcohol use: Yes     Alcohol/week: 2.0 - 3.0 standard drinks     Types: 2 - 3 Shots of liquor per week     Comment: 2-3 night    Drug use: No    Sexual activity: None   Lifestyle    Physical activity     Days per week: None     Minutes per session: None    Stress: None   Relationships    Social connections     Talks on phone: None     Gets together: None     Attends Anabaptist service: None     Active member of club or organization: None     Attends meetings of clubs or organizations: None     Relationship status: None    Intimate partner violence     Fear of current or ex partner: None     Emotionally abused: None     Physically abused: None     Forced sexual activity: None   Other Topics Concern    None   Social History Narrative    None     Family History   Problem Relation Age of Onset    Heart Disease Mother     High Blood Pressure Mother     Arthritis Mother     Cancer Father 66        prostate    Alcohol Abuse Brother                               Review Of Systems    Review of Systems   Constitutional: Negative for chills and fever. Respiratory: Negative for shortness of breath. Cardiovascular: Negative for chest pain. PHYSICAL EXAMINATION:    /72 (Site: Left Upper Arm, Position: Sitting, Cuff Size: Large Adult)   Pulse 81   Temp 95.9 °F (35.5 °C) (Temporal)   Ht 6' (1.829 m)   Wt 225 lb (102.1 kg)   SpO2 99% Comment: RA  BMI 30.52 kg/m²      Physical Exam  Vitals signs reviewed. Constitutional:       Appearance: Normal appearance. HENT:      Head: Normocephalic and atraumatic. Eyes:      Extraocular Movements: Extraocular movements intact. Conjunctiva/sclera: Conjunctivae normal.   Cardiovascular:      Rate and Rhythm: Normal rate and regular rhythm.       Heart sounds: Normal heart sounds. Pulmonary:      Effort: Pulmonary effort is normal.      Breath sounds: Normal breath sounds. Skin:     General: Skin is warm and dry. Neurological:      General: No focal deficit present. Mental Status: He is alert and oriented to person, place, and time. ASSESSMENT:   Well Adult, See encounter diagnoses  Agapito Ross was seen today for hyperlipidemia, hypothyroidism, gastroesophageal reflux, atrial fibrillation and sleep apnea. Diagnoses and all orders for this visit:    Hyperlipidemia, unspecified hyperlipidemia type  Continue current regimen    Hypothyroidism, unspecified type  -     TSH without Reflex    Atrial fibrillation, unspecified type ([facility]  Stable continue current regimen and continue to follow-up with cardiology    Gastroesophageal reflux disease, esophagitis presence not specified  Continue current regimen    Nocturia  -     PSA, Prostatic Specific Antigen    Pre-diabetes  -     HEMOGLOBIN A1C          Plan:   See orders and medications filed with this encounter. Return in about 6 months (around 2/21/2021).

## 2020-08-22 LAB
ESTIMATED AVERAGE GLUCOSE: 119.8 MG/DL
HBA1C MFR BLD: 5.8 %

## 2020-08-24 RX ORDER — MINOCYCLINE HYDROCHLORIDE 100 MG/1
CAPSULE ORAL
Qty: 90 CAPSULE | Refills: 1 | Status: SHIPPED | OUTPATIENT
Start: 2020-08-24

## 2020-08-27 ENCOUNTER — TELEPHONE (OUTPATIENT)
Dept: FAMILY MEDICINE CLINIC | Age: 65
End: 2020-08-27

## 2020-08-27 RX ORDER — ATORVASTATIN CALCIUM 80 MG/1
TABLET, FILM COATED ORAL
Qty: 90 TABLET | Refills: 1 | Status: SHIPPED | OUTPATIENT
Start: 2020-08-27

## 2020-10-21 ENCOUNTER — OFFICE VISIT (OUTPATIENT)
Dept: FAMILY MEDICINE CLINIC | Age: 65
End: 2020-10-21
Payer: MEDICARE

## 2020-10-21 VITALS
SYSTOLIC BLOOD PRESSURE: 110 MMHG | WEIGHT: 227.7 LBS | BODY MASS INDEX: 37.94 KG/M2 | TEMPERATURE: 97.7 F | HEART RATE: 94 BPM | HEIGHT: 65 IN | OXYGEN SATURATION: 98 % | DIASTOLIC BLOOD PRESSURE: 70 MMHG

## 2020-10-21 PROCEDURE — G8484 FLU IMMUNIZE NO ADMIN: HCPCS | Performed by: FAMILY MEDICINE

## 2020-10-21 PROCEDURE — 1123F ACP DISCUSS/DSCN MKR DOCD: CPT | Performed by: FAMILY MEDICINE

## 2020-10-21 PROCEDURE — G0438 PPPS, INITIAL VISIT: HCPCS | Performed by: FAMILY MEDICINE

## 2020-10-21 PROCEDURE — 3017F COLORECTAL CA SCREEN DOC REV: CPT | Performed by: FAMILY MEDICINE

## 2020-10-21 PROCEDURE — 90732 PPSV23 VACC 2 YRS+ SUBQ/IM: CPT | Performed by: FAMILY MEDICINE

## 2020-10-21 PROCEDURE — G0009 ADMIN PNEUMOCOCCAL VACCINE: HCPCS | Performed by: FAMILY MEDICINE

## 2020-10-21 PROCEDURE — 4040F PNEUMOC VAC/ADMIN/RCVD: CPT | Performed by: FAMILY MEDICINE

## 2020-10-21 ASSESSMENT — PATIENT HEALTH QUESTIONNAIRE - PHQ9
SUM OF ALL RESPONSES TO PHQ QUESTIONS 1-9: 0
SUM OF ALL RESPONSES TO PHQ9 QUESTIONS 1 & 2: 0
1. LITTLE INTEREST OR PLEASURE IN DOING THINGS: 0
SUM OF ALL RESPONSES TO PHQ QUESTIONS 1-9: 0
SUM OF ALL RESPONSES TO PHQ QUESTIONS 1-9: 0
2. FEELING DOWN, DEPRESSED OR HOPELESS: 0

## 2020-10-21 ASSESSMENT — LIFESTYLE VARIABLES
HOW OFTEN DURING THE LAST YEAR HAVE YOU BEEN UNABLE TO REMEMBER WHAT HAPPENED THE NIGHT BEFORE BECAUSE YOU HAD BEEN DRINKING: 0
HAVE YOU OR SOMEONE ELSE BEEN INJURED AS A RESULT OF YOUR DRINKING: 0
HOW MANY STANDARD DRINKS CONTAINING ALCOHOL DO YOU HAVE ON A TYPICAL DAY: 1
AUDIT TOTAL SCORE: 5
HOW OFTEN DURING THE LAST YEAR HAVE YOU HAD A FEELING OF GUILT OR REMORSE AFTER DRINKING: 0
HOW OFTEN DURING THE LAST YEAR HAVE YOU NEEDED AN ALCOHOLIC DRINK FIRST THING IN THE MORNING TO GET YOURSELF GOING AFTER A NIGHT OF HEAVY DRINKING: 0
HOW OFTEN DO YOU HAVE A DRINK CONTAINING ALCOHOL: 4
HOW OFTEN DO YOU HAVE SIX OR MORE DRINKS ON ONE OCCASION: 0
HOW OFTEN DURING THE LAST YEAR HAVE YOU FOUND THAT YOU WERE NOT ABLE TO STOP DRINKING ONCE YOU HAD STARTED: 0
HOW OFTEN DURING THE LAST YEAR HAVE YOU FAILED TO DO WHAT WAS NORMALLY EXPECTED FROM YOU BECAUSE OF DRINKING: 0
HAS A RELATIVE, FRIEND, DOCTOR, OR ANOTHER HEALTH PROFESSIONAL EXPRESSED CONCERN ABOUT YOUR DRINKING OR SUGGESTED YOU CUT DOWN: 0
AUDIT-C TOTAL SCORE: 5

## 2020-10-21 NOTE — PATIENT INSTRUCTIONS
Personalized Preventive Plan for Alexsandra Medrano - 10/21/2020  Medicare offers a range of preventive health benefits. Some of the tests and screenings are paid in full while other may be subject to a deductible, co-insurance, and/or copay. Some of these benefits include a comprehensive review of your medical history including lifestyle, illnesses that may run in your family, and various assessments and screenings as appropriate. After reviewing your medical record and screening and assessments performed today your provider may have ordered immunizations, labs, imaging, and/or referrals for you. A list of these orders (if applicable) as well as your Preventive Care list are included within your After Visit Summary for your review. Other Preventive Recommendations:    · A preventive eye exam performed by an eye specialist is recommended every 1-2 years to screen for glaucoma; cataracts, macular degeneration, and other eye disorders. · A preventive dental visit is recommended every 6 months. · Try to get at least 150 minutes of exercise per week or 10,000 steps per day on a pedometer . · Order or download the FREE \"Exercise & Physical Activity: Your Everyday Guide\" from [employer]. Call 1-722.611.1523 or search [employer] online. · You need 6452-9846 mg of calcium and 3259-4205 IU of vitamin D per day. It is possible to meet your calcium requirement with diet alone, but a vitamin D supplement is usually necessary to meet this goal.  · When exposed to the sun, use a sunscreen that protects against both UVA and UVB radiation with an SPF of 30 or greater. Reapply every 2 to 3 hours or after sweating, drying off with a towel, or swimming. · Always wear a seat belt when traveling in a car. Always wear a helmet when riding a bicycle or motorcycle.        Advance Care Planning: Care Instructions  Your Care Instructions     It can be hard to live with an illness that cannot be cured. But if your health is getting worse, you may want to make decisions about end-of-life care. Planning for the end of your life does not mean that you are giving up. It is a way to make sure that your wishes are met. Clearly stating your wishes can make it easier for your loved ones. Making plans while you are still able may also ease your mind and make your final days less stressful and more meaningful. Follow-up care is a key part of your treatment and safety. Be sure to make and go to all appointments, and call your doctor if you are having problems. It's also a good idea to know your test results and keep a list of the medicines you take. What can you do to plan for the end of life? You can bring these issues up with your doctor. You do not need to wait until your doctor starts the conversation. You might start with \"I would not be willing to live with . Rawleigh Billing Rawleigh Billing Rawleigh Billing \" When you complete this sentence it helps your doctor understand your wishes. Talk openly and honestly with your doctor. This is the best way to understand the decisions you will need to make as your health changes. Know that you can always change your mind. Ask your doctor about commonly used life-support measures. These include tube feedings, breathing machines, and fluids given through a vein (IV). Understanding these treatments will help you decide whether you want them. You may choose to have these life-supporting treatments for a limited time. This allows a trial period to see whether they will help you. You may also decide that you want your doctor to take only certain measures to keep you alive. It is important to spell out these conditions so that your doctor and family understand them. Talk to your doctor about how long you are likely to live. He or she may be able to give you an idea of what usually happens with your specific illness. Think about preparing papers that state your wishes.  This way there will not be any confusion about what you want. You can change your instructions at any time. Which papers should you prepare? Advance directives are legal papers that tell doctors how you want to be cared for at the end of your life. You do not need a  to write these papers. Ask your doctor or your state health department for information on how to write your advance directives. They may have the forms for each of these types of papers. Make sure your doctor has a copy of these on file, and give a copy to a family member or close friend. Consider a do-not-resuscitate order (DNR). This order asks that no extra treatments be done if your heart stops or you stop breathing. Extra treatments may include cardiopulmonary resuscitation (CPR), electrical shock to restart your heart, or a machine to breathe for you. If you decide to have a DNR order, ask your doctor to explain and write it. Place the order in your home where everyone can easily see it. Consider a living will. A living will explains your wishes about life support and other treatments at the end of your life if you become unable to speak for yourself. Living crespo tell doctors to use or not use treatments that would keep you alive. You must have one or two witnesses or a notary present when you sign this form. A living will may be called something else in your state. Consider a medical power of . This form allows you to name a person to make decisions about your care if you are not able to. Most people ask a close friend or family member. Talk to this person about the kinds of treatments you want and those that you do not want. Make sure this person understands your wishes. A medical power of  may be called something else in your state. These legal papers are simple to change. Tell your doctor what you want to change, and ask him or her to make a note in your medical file. Give your family updated copies of the papers. Where can you learn more?   Go to [email or web address]://[email or web address] org and sign in to your MakerBot account. Enter P184 in the [place] box to learn more about \"Advance Care Planning: Care Instructions. \"     If you do not have an account, please click on the \"Sign Up Now\" link. Current as of: December 9, 2019               Content Version: 12.6  © 5412-8857 [employer]. Care instructions adapted under license by [facility] ([facility]). If you have questions about a medical condition or this instruction, always ask your healthcare professional. [address] any warranty or liability for your use of this information. ·        Learning About Zen Mcconnell  What is a living will? A living will, also called a declaration, is a legal form. It tells your family and your doctor your wishes when you can't speak for yourself. It's used by the health professionals who will treat you as you near the end of your life or if you get seriously hurt or ill. If you put your wishes in writing, your loved ones and others will know what kind of care you want. They won't need to guess. This can ease your mind and be helpful to others. And you can change or cancel your living will at any time. A living will is not the same as an estate or property will. An estate will explains what you want to happen with your money and property after you die. How do you use it? A living will is used to describe the kinds of treatment or life support you want as you near the end of your life or if you get seriously hurt or ill. Keep these facts in mind about living crespo. Your living will is used only if you can't speak or make decisions for yourself. Most often, one or more doctors must certify that you can't speak or decide for yourself before your living will takes effect. If you get better and can speak for yourself again, you can accept or refuse any treatment. It doesn't matter what you said in your living will.   Some states may limit your right to refuse treatment in certain cases. For example, you may need to clearly state in your living will that you don't want artificial hydration and nutrition, such as being fed through a tube. Is a living will a legal document? A living will is a legal document. Each state has its own laws about living crespo. And a living will may be called something else in your state. Here are some things to know about living crespo. You don't need an  to complete a living will. But legal advice can be helpful if your state's laws are unclear. It can also help if your health history is complicated or your family can't agree on what should be in your living will. You can change your living will at any time. Some people find that their wishes about end-of-life care change as their health changes. If you make big changes to your living will, complete a new form. If you move to another state, make sure that your living will is legal in the state where you now live. In most cases, doctors will respect your wishes even if you have a form from a different state. You might use a universal form that has been approved by many states. This kind of form can sometimes be filled out and stored online. Your digital copy will then be available wherever you have a connection to the internet. The doctors and nurses who need to treat you can find it right away. Your state may offer an online registry. This is another place where you can store your living will online. It's a good idea to get your living will notarized. This means using a person called a  to watch two people sign, or witness, your living will. What should you know when you create a living will? Here are some questions to ask yourself as you make your living will:  Do you know enough about life support methods that might be used?  If not, talk to your doctor so you know what might be done if you can't breathe on your own, your decisions may be made by a medical professional who doesn't know you well. In some cases, a  makes the decisions. When you name a health care agent, it is very clear who has the power to make health decisions for you. How do you name a health care agent? You name your health care agent on a legal form. This form is usually called a medical power of . Ask your hospital, state bar association, or office on aging where to find these forms. You must sign the form to make it legal. Some states require you to get the form notarized. This means that a person called a  watches you sign the form and then he or she signs the form. Some states also require that two or more witnesses sign the form. Be sure to tell your family members and doctors who your health care agent is. Where can you learn more? Go to [email or web address]://[email or web address].[email or web address] and sign in to your MeUndies account. Enter [id number] in the Refurrl box to learn more about \"Learning About [address]. \"     If you do not have an account, please click on the \"Sign Up Now\" link. Current as of: December 9, 2019               Content Version: 12.6  © 6791-6453 [employer]. Care instructions adapted under license by [facility] ([facility]). If you have questions about a medical condition or this instruction, always ask your healthcare professional. [place] [address] any warranty or liability for your use of this information.     ·

## 2020-10-21 NOTE — PROGRESS NOTES
Medicare Annual Wellness Visit  Name: Bobbi Higginbotham Date: 10/21/2020   MRN: <[id number]> Sex: Male   Age: 76 y.o. Ethnicity: Non-/Non    : 1955 Race: Matthew Huff is here for Medicare AWV    Screenings for behavioral, psychosocial and functional/safety risks, and cognitive dysfunction are all negative except as indicated below. These results, as well as other patient data from the [address] form, are documented in Flowsheets linked to this Encounter. Allergies   Allergen Reactions    Latex Dermatitis    Sulfa Antibiotics Rash       Prior to Visit Medications    Medication Sig Taking?  Authorizing Provider   apixaban (ELIQUIS) 5 MG TABS tablet TAKE 1 TABLET TWICE DAILY  SILVIO Urrutia CNP   atorvastatin (LIPITOR) 80 MG tablet TAKE 1 TABLET BY MOUTH EVERY DAY  Tim Tierney MD   minocycline (MINOCIN;DYNACIN) 100 MG capsule TAKE 1 CAPSULE EVERY DAY  SILVIO Her CNP   levothyroxine (SYNTHROID) 75 MCG tablet TAKE 1 TABLET EVERY DAY  Tim Tierney MD   dilTIAZem (CARDIZEM CD) 120 MG extended release capsule Take 1 capsule by mouth daily  SILVIO Urrutia CNP   omeprazole (PRILOSEC) 20 MG delayed release capsule Take 1 capsule by mouth daily  Tim Tierney MD   multivitamin-iron-minerals-folic acid (CENTRUM) chewable tablet Take 1 tablet by mouth daily  Historical Provider, MD   Azelaic Acid 15 % GEL Apply 1 drop topically See Admin Instructions  Tim Tierney MD   fluticasone (FLONASE) 50 MCG/ACT nasal spray 1 spray by Nasal route daily  Historical Provider, MD       Past Medical History:   Diagnosis Date    Arthritis     Cerebral artery occlusion with cerebral infarction ([facility] [address]     Chronic kidney disease     childhood nephritis    Rosacea     Sleep apnea     compliant with cpap    Thyroid disease        Past Surgical History:   Procedure Laterality Date    COLONOSCOPY      COLONOSCOPY  2017 polyp    ELBOW SURGERY Left     MOUTH SURGERY      SC DECOMPRESS FOREARM,EXCIS MUSC/NERV Left 12/24/2018    LEFT CUBITAL TUNNEL RELEASE performed by Catherine Julien MD at [address]  11/06/2017    VASECTOMY      WISDOM TOOTH EXTRACTION  2015       Family History   Problem Relation Age of Onset    Heart Disease Mother     High Blood Pressure Mother     Arthritis Mother     Cancer Father 66        prostate    Alcohol Abuse Brother        CareTeam (Including outside providers/suppliers regularly involved in providing care):   Patient Care Team:  SILVIO Tamayo CNP as PCP - General  SILVIO Tamayo CNP as PCP - [facility] Empaneled Provider  Catherine Julien MD as Surgeon (Orthopedic Surgery)    Wt Readings from Last 3 Encounters:   10/21/20 227 lb 11.2 oz (103.3 kg)   08/21/20 225 lb (102.1 kg)   06/11/20 225 lb (102.1 kg)     Vitals:    10/21/20 1121   BP: 110/70   Pulse: 94   Temp: 97.7 °F (36.5 °C)   SpO2: 98%   Weight: 227 lb 11.2 oz (103.3 kg)   Height: 5' 5\" (1.651 m)     Body mass index is 37.89 kg/m². Based upon direct observation of the patient, evaluation of cognition reveals recent and remote memory intact. Patient's complete Health Risk Assessment and screening values have been reviewed and are found in Flowsheets. The following problems were reviewed today and where indicated follow up appointments were made and/or referrals ordered. Positive Risk Factor Screenings with Interventions:     General Health and ACP:  General  In general, how would you say your health is?: Good  In the past 7 days, have you experienced any of the following?  New or Increased Pain, New or Increased Fatigue, Loneliness, Social Isolation, Stress or Anger?: None of These  Do you get the social and emotional support that you need?: Yes  Do you have a Living Will?: Yes  Advance Directives     Power of  Living Will ACP-Advance Directive ACP-Power of     Not on File Not on File Filed [address] [place] Risk Interventions:  · No Living Will: Advance Care Planning addressed with patient today    Health Habits/Nutrition:  Health Habits/Nutrition  Do you exercise for at least 20 minutes 2-3 times per week?: Yes  Have you lost any weight without trying in the past 3 months?: No  Do you eat fewer than 2 meals per day?: No  Have you seen a dentist within the past year?: Yes  Body mass index: (!) 37.89  Health Habits/Nutrition Interventions:  · Nutritional issues:  educational materials for healthy, well-balanced diet provided    Hearing/Vision:  No exam data present  Hearing/Vision  Do you or your family notice any trouble with your hearing?: (!) Yes  Do you have difficulty driving, watching TV, or doing any of your daily activities because of your eyesight?: No  Have you had an eye exam within the past year?: Yes  Hearing/Vision Interventions:  · Hearing concerns:  patient declines any further evaluation/treatment for hearing issues    Personalized Preventive Plan   Current Health Maintenance Status  Immunization History   Administered Date(s) Administered    Influenza Vaccine, unspecified formulation 10/18/2013, 10/16/2014    Influenza Virus Vaccine 10/06/2018    Influenza, High Dose (Fluzone 65 yrs and older) 09/20/2019    Influenza, High-dose, Quadv, 65 yrs +, IM (Fluzone) 08/16/2020    Influenza, Intradermal, Quadrivalent, Preservative Free 11/17/2017    Influenza, Quadv, IM, PF (6 mo and older Fluzone, Flulaval, Fluarix, and 3 yrs and older Afluria) 12/14/2015, 10/06/2018    Pneumococcal Conjugate 13-valent (Mardel Satinder) 09/20/2019    Tdap (Boostrix, Adacel) 06/30/2017    Zoster Live (Zostavax) 08/14/2015    Zoster Recombinant (Shingrix) 09/20/2019, 11/23/2019        Health Maintenance   Topic Date Due    AAA screen  1955    Annual Wellness Visit (AWV)  09/06/2019    Pneumococcal 65+ years Vaccine (2 of 2 - PPSV23) 09/20/2020    Lipid screen  11/08/2020    Low dose CT lung screening  06/08/2021    A1C test (Diabetic or Prediabetic)  08/21/2021    TSH testing  08/21/2021    DTaP/Tdap/Td vaccine (2 - Td) 06/30/2027    Colon cancer screen colonoscopy  08/24/2030    Flu vaccine  Completed    Shingles Vaccine  Completed    Hepatitis C screen  Completed    Hepatitis A vaccine  Aged Out    Hepatitis B vaccine  Aged Out    Hib vaccine  Aged Out    Meningococcal (ACWY) vaccine  Aged Out     Recommendations for [address] Due: see orders and patient instructions/AVS.  . Recommended screening schedule for the next 5-10 years is provided to the patient in written form: see Patient Instructions/AVS.    Cristin DENT LPN, [id number], performed the documented evaluation under the direct supervision of the attending physician. This encounter was performed under Anila mcrae Mc, MDs, direct supervision, 10/21/2020.

## 2020-10-28 RX ORDER — DILTIAZEM HYDROCHLORIDE 120 MG/1
120 CAPSULE, COATED, EXTENDED RELEASE ORAL DAILY
Qty: 90 CAPSULE | Refills: 2 | Status: SHIPPED | OUTPATIENT
Start: 2020-10-28

## 2020-11-06 ENCOUNTER — TELEPHONE (OUTPATIENT)
Dept: PULMONOLOGY | Age: 65
End: 2020-11-06

## 2020-11-06 ENCOUNTER — OFFICE VISIT (OUTPATIENT)
Dept: FAMILY MEDICINE CLINIC | Age: 65
End: 2020-11-06
Payer: MEDICARE

## 2020-11-06 VITALS
BODY MASS INDEX: 30.94 KG/M2 | HEART RATE: 69 BPM | WEIGHT: 228.4 LBS | HEIGHT: 72 IN | DIASTOLIC BLOOD PRESSURE: 64 MMHG | TEMPERATURE: 97.8 F | SYSTOLIC BLOOD PRESSURE: 118 MMHG | OXYGEN SATURATION: 99 %

## 2020-11-06 PROCEDURE — G8417 CALC BMI ABV UP PARAM F/U: HCPCS | Performed by: FAMILY MEDICINE

## 2020-11-06 PROCEDURE — 1036F TOBACCO NON-USER: CPT | Performed by: FAMILY MEDICINE

## 2020-11-06 PROCEDURE — 1123F ACP DISCUSS/DSCN MKR DOCD: CPT | Performed by: FAMILY MEDICINE

## 2020-11-06 PROCEDURE — 3017F COLORECTAL CA SCREEN DOC REV: CPT | Performed by: FAMILY MEDICINE

## 2020-11-06 PROCEDURE — G8484 FLU IMMUNIZE NO ADMIN: HCPCS | Performed by: FAMILY MEDICINE

## 2020-11-06 PROCEDURE — 99214 OFFICE O/P EST MOD 30 MIN: CPT | Performed by: FAMILY MEDICINE

## 2020-11-06 PROCEDURE — G8427 DOCREV CUR MEDS BY ELIG CLIN: HCPCS | Performed by: FAMILY MEDICINE

## 2020-11-06 PROCEDURE — 4040F PNEUMOC VAC/ADMIN/RCVD: CPT | Performed by: FAMILY MEDICINE

## 2020-11-06 RX ORDER — AZELAIC ACID 0.15 G/G
1 GEL TOPICAL SEE ADMIN INSTRUCTIONS
Qty: 1 TUBE | Refills: 1 | Status: SHIPPED | OUTPATIENT
Start: 2020-11-06 | End: 2020-12-04 | Stop reason: SDUPTHER

## 2020-11-06 SDOH — HEALTH STABILITY: MENTAL HEALTH
STRESS IS WHEN SOMEONE FEELS TENSE, NERVOUS, ANXIOUS, OR CAN'T SLEEP AT NIGHT BECAUSE THEIR MIND IS TROUBLED. HOW STRESSED ARE YOU?: NOT AT ALL

## 2020-11-06 SDOH — ECONOMIC STABILITY: FOOD INSECURITY: WITHIN THE PAST 12 MONTHS, THE FOOD YOU BOUGHT JUST DIDN'T LAST AND YOU DIDN'T HAVE MONEY TO GET MORE.: NEVER TRUE

## 2020-11-06 SDOH — SOCIAL STABILITY: SOCIAL INSECURITY: WITHIN THE LAST YEAR, HAVE YOU BEEN HUMILIATED OR EMOTIONALLY ABUSED IN OTHER WAYS BY YOUR PARTNER OR EX-PARTNER?: NO

## 2020-11-06 SDOH — ECONOMIC STABILITY: INCOME INSECURITY: HOW HARD IS IT FOR YOU TO PAY FOR THE VERY BASICS LIKE FOOD, HOUSING, MEDICAL CARE, AND HEATING?: NOT HARD AT ALL

## 2020-11-06 SDOH — SOCIAL STABILITY: SOCIAL NETWORK: ARE YOU MARRIED, WIDOWED, DIVORCED, SEPARATED, NEVER MARRIED, OR LIVING WITH A PARTNER?: MARRIED

## 2020-11-06 SDOH — ECONOMIC STABILITY: TRANSPORTATION INSECURITY
IN THE PAST 12 MONTHS, HAS THE LACK OF TRANSPORTATION KEPT YOU FROM MEDICAL APPOINTMENTS OR FROM GETTING MEDICATIONS?: NO

## 2020-11-06 SDOH — SOCIAL STABILITY: SOCIAL NETWORK: HOW OFTEN DO YOU ATTENT MEETINGS OF THE CLUB OR ORGANIZATION YOU BELONG TO?: MORE THAN 4 TIMES PER YEAR

## 2020-11-06 SDOH — HEALTH STABILITY: PHYSICAL HEALTH: ON AVERAGE, HOW MANY DAYS PER WEEK DO YOU ENGAGE IN MODERATE TO STRENUOUS EXERCISE (LIKE A BRISK WALK)?: 7 DAYS

## 2020-11-06 SDOH — SOCIAL STABILITY: SOCIAL INSECURITY: WITHIN THE LAST YEAR, HAVE YOU BEEN AFRAID OF YOUR PARTNER OR EX-PARTNER?: NO

## 2020-11-06 SDOH — SOCIAL STABILITY: SOCIAL NETWORK: HOW OFTEN DO YOU GET TOGETHER WITH FRIENDS OR RELATIVES?: TWICE A WEEK

## 2020-11-06 SDOH — SOCIAL STABILITY: SOCIAL INSECURITY
WITHIN THE LAST YEAR, HAVE TO BEEN RAPED OR FORCED TO HAVE ANY KIND OF SEXUAL ACTIVITY BY YOUR PARTNER OR EX-PARTNER?: NO

## 2020-11-06 SDOH — SOCIAL STABILITY: SOCIAL NETWORK: HOW OFTEN DO YOU ATTEND CHURCH OR RELIGIOUS SERVICES?: NEVER

## 2020-11-06 SDOH — HEALTH STABILITY: PHYSICAL HEALTH: ON AVERAGE, HOW MANY MINUTES DO YOU ENGAGE IN EXERCISE AT THIS LEVEL?: 60 MIN

## 2020-11-06 SDOH — ECONOMIC STABILITY: TRANSPORTATION INSECURITY
IN THE PAST 12 MONTHS, HAS LACK OF TRANSPORTATION KEPT YOU FROM MEETINGS, WORK, OR FROM GETTING THINGS NEEDED FOR DAILY LIVING?: NO

## 2020-11-06 SDOH — ECONOMIC STABILITY: FOOD INSECURITY: WITHIN THE PAST 12 MONTHS, YOU WORRIED THAT YOUR FOOD WOULD RUN OUT BEFORE YOU GOT MONEY TO BUY MORE.: NEVER TRUE

## 2020-11-06 SDOH — SOCIAL STABILITY: SOCIAL NETWORK
DO YOU BELONG TO ANY CLUBS OR ORGANIZATIONS SUCH AS CHURCH GROUPS UNIONS, FRATERNAL OR ATHLETIC GROUPS, OR SCHOOL GROUPS?: YES

## 2020-11-06 SDOH — SOCIAL STABILITY: SOCIAL NETWORK: IN A TYPICAL WEEK, HOW MANY TIMES DO YOU TALK ON THE PHONE WITH FAMILY, FRIENDS, OR NEIGHBORS?: TWICE A WEEK

## 2020-11-06 SDOH — SOCIAL STABILITY: SOCIAL INSECURITY
WITHIN THE LAST YEAR, HAVE YOU BEEN KICKED, HIT, SLAPPED, OR OTHERWISE PHYSICALLY HURT BY YOUR PARTNER OR EX-PARTNER?: NO

## 2020-11-06 ASSESSMENT — ENCOUNTER SYMPTOMS
BLOOD IN STOOL: 0
SHORTNESS OF BREATH: 0
EYE DISCHARGE: 0
RHINORRHEA: 0
CONSTIPATION: 0
EYE PAIN: 0
BACK PAIN: 0
DIARRHEA: 0
WHEEZING: 0
SORE THROAT: 0
TROUBLE SWALLOWING: 0
COLOR CHANGE: 0
ABDOMINAL PAIN: 0

## 2020-11-06 NOTE — PATIENT INSTRUCTIONS
Continue current medications  See Joe Nieto for sleep  See Jacquelyne Bloch Bachelor for a fib  Get blood work again next Summer in July/August  -see me back then in about 9 months  Patient Education        Nutrition for Older Adults: Care Instructions  Your Care Instructions     Good nutrition is important at any age. But it is especially important for older adults. Eating a healthy diet helps keep your body strong. And it can help lower your risk for disease. As you get older, your nutrition needs change. Your body needs more of certain nutrients. These include vitamin B12, calcium, and vitamin D. But it may be harder for you to get these and other important nutrients. This could be for many reasons. You may not feel as hungry as you used to. Or you could have problems with your teeth or mouth that make it hard to chew. Or you may not enjoy planning and preparing meals, especially if you live alone. Now that you need to get all your nutrients from less food, it is important to plan what you eat. The suggestions below can help you get the nutrition you need. If you still need help, talk with your doctor. He or she may recommend that you work with a dietitian. A dietitian can help you plan meals. Follow-up care is a key part of your treatment and safety. Be sure to make and go to all appointments, and call your doctor if you are having problems. It's also a good idea to know your test results and keep a list of the medicines you take. How can you care for yourself at home? To stay healthy  · Eat a variety of foods. The more you vary the foods you eat, the more vitamins, minerals, and other nutrients you get. · Take a multivitamin every day. Choose one with about 100% of the daily value (DV) for vitamins and minerals. Do not take more than 100% of the daily value for any vitamin or mineral unless your doctor tells you to. Talk with your doctor if you are not sure which multivitamin is right for you.   · Eat lots of trouble preparing meals  · If you are able, take a cooking class. · Use a microwave oven to cook TV dinners and other frozen or prepared foods. · Take part in group meal programs. You can find these through senior citizen programs. · Have meals brought to your home. Your community may offer programs that deliver meals, such as Meals on Wheels. If your appetite is poor  · Try eating smaller amounts of food more often. For example, eat 4 or 5 small meals a day instead of 1 or 2 large meals. · Eat with family and friends. Or take part in group meal programs offered through volunteer programs. Eating with others may help your appetite. And it helps you be more social.  · Ask your doctor if your medicines could cause appetite or taste problems. If so, ask about changing medicines. · Add spices and herbs to increase the flavor of food. · If you think you are depressed, ask your doctor for help. Depression can affect your appetite. And it can make it hard to do everyday activities like grocery shopping and making meals. Treatment can help. When should you call for help? Watch closely for changes in your health, and be sure to contact your doctor if you have any problems. Where can you learn more? Go to https://Remediation of NevadapeWizzgo.Wonderswamp. org and sign in to your Panorama9 account. Enter R935 in the [employer] box to learn more about \"Nutrition for Older Adults: Care Instructions. \"     If you do not have an account, please click on the \"Sign Up Now\" link. Current as of: August 22, 2019               Content Version: 12.6  © 1376-6069 [employer]. Care instructions adapted under license by [facility] ([facility]). If you have questions about a medical condition or this instruction, always ask your healthcare professional. [address] any warranty or liability for your use of this information.

## 2020-11-06 NOTE — TELEPHONE ENCOUNTER

## 2020-11-06 NOTE — PROGRESS NOTES
SUBJECTIVE:    Chief Complaint   Patient presents with   [facility] Establish Care     pt states that he is here to establish care with Dr. Charu Conner, is not fasting for bw    Atrial Fibrillation    Cerebrovascular Accident    Rosacea    Hypothyroidism    Gastroesophageal Reflux    Sleep Apnea     HPI     Ventura Elver is a 76 y.o.male that presents today for establish care visit:    -Atrial Fibrillation:  Sees Violetta Jarrell CNP  Physician Dr. Srinivasa Soriano left the practice  Sees her every 6-12 months  Cardizem 120 mg daily  Eliquis 5 mg    -CVA:  Had stroke one year ago  Lipitor 80 mg daily    Rosacea:  Minocycline + azeliac acid    -Hypothyroid:  Synthroid 75 mcg daily  Lab Results   Component Value Date    TSH 3.08 08/21/2020    T4FREE 1.2 02/15/2016     -GERD:  Bothered him for long time  Had EGD, had biopsies  Realized it was something he was eating  Gets hearburn if doesn't atke it  Omeprazole 20 mg daily OTC    -MV centrum, Niacin and Biotin  -takes relief factor for arthritis    -Flonase PRN    -REINIER: sleep mask, [facility]    Past Medical History:   Diagnosis Date    Arthritis     Cerebral artery occlusion with cerebral infarction ([facility] [address]     Chronic kidney disease     childhood nephritis    Rosacea     Sleep apnea     compliant with cpap    Thyroid disease        Past Surgical History:   Procedure Laterality Date    COLONOSCOPY  2012    COLONOSCOPY  08/23/2017    polyp    ELBOW SURGERY Left     MOUTH SURGERY      NH DECOMPRESS FOREARM,EXCIS MUSC/NERV Left 12/24/2018    LEFT CUBITAL TUNNEL RELEASE performed by Declan Avalos MD at [address]  11/06/2017    VASECTOMY      WISDOM TOOTH EXTRACTION  2015       Family History   Problem Relation Age of Onset    Heart Disease Mother     High Blood Pressure Mother     Arthritis Mother     Cancer Father         prostate    Alcohol Abuse Brother        Current Outpatient Medications   Medication Sig Dispense Refill    Azelaic Acid 15 % GEL Apply 1 drop topically See Admin Instructions 1 Tube 1    dilTIAZem (CARDIZEM CD) 120 MG extended release capsule Take 1 capsule by mouth daily 90 capsule 2    apixaban (ELIQUIS) 5 MG TABS tablet TAKE 1 TABLET TWICE DAILY 90 tablet 2    atorvastatin (LIPITOR) 80 MG tablet TAKE 1 TABLET BY MOUTH EVERY DAY 90 tablet 1    minocycline (MINOCIN;DYNACIN) 100 MG capsule TAKE 1 CAPSULE EVERY DAY 90 capsule 1    levothyroxine (SYNTHROID) 75 MCG tablet TAKE 1 TABLET EVERY DAY 90 tablet 1    omeprazole (PRILOSEC) 20 MG delayed release capsule Take 1 capsule by mouth daily 90 capsule 1    multivitamin-iron-minerals-folic acid (CENTRUM) chewable tablet Take 1 tablet by mouth daily      fluticasone (FLONASE) 50 MCG/ACT nasal spray 1 spray by Nasal route daily       No current facility-administered medications for this visit. Allergies   Allergen Reactions    Latex Dermatitis    Sulfa Antibiotics Rash       Social History     Socioeconomic History    Marital status:      Spouse name: Whitney garcia    Number of children: 2    Years of education: 23    Highest education level: Master's degree (e.g., MA, MS, Enrrique, MEd, MSW, EMILY)   Occupational History    Not on file   Social Needs    Financial resource strain: Not hard at all   [employer] insecurity     Worry: Never true     Inability: Never true   [employer] needs     Medical: No     Non-medical: No   Tobacco Use    Smoking status: Former Smoker     Packs/day: 1.00     Years: 38.00     Pack years: 38.00     Types: Cigarettes     Last attempt to quit: 2009     Years since quittin.8    Smokeless tobacco: Former User   Substance and Sexual Activity    Alcohol use:  Yes     Alcohol/week: 2.0 - 3.0 standard drinks     Types: 2 - 3 Shots of liquor per week     Comment: 2-3 night    Drug use: No    Sexual activity: Yes     Partners: Female   Lifestyle    Physical activity     Days per week: 7 days Minutes per session: 60 min    Stress: Not at all   Relationships    Social connections     Talks on phone: Twice a week     Gets together: Twice a week     Attends Hindu service: Never     Active member of club or organization: Yes     Attends meetings of clubs or organizations: More than 4 times per year     Relationship status:     Intimate partner violence     Fear of current or ex partner: No     Emotionally abused: No     Physically abused: No     Forced sexual activity: No   Other Topics Concern    Not on file   Social History Narrative    -From [place] [address]    -got degree in [address] here then [place]    -came back in [address]    Retired 2019- [address] [employer], masters and bachelor's        2 children [address]    1 grandchild Isaac Paredes everyday 2.5 miles    Has weights, not as much    Treadmill and elliptical       Immunization History   Administered Date(s) Administered    Influenza Vaccine, unspecified formulation 10/18/2013, 10/16/2014    Influenza Virus Vaccine 10/06/2018    Influenza, High Dose (Fluzone 65 yrs and older) 09/20/2019    Influenza, High-dose, Quadv, 72 yrs +, IM (Fluzone) 08/16/2020    Influenza, Intradermal, Quadrivalent, Preservative Free 11/17/2017    Influenza, Quadv, IM, PF (6 mo and older Fluzone, Flulaval, Fluarix, and 3 yrs and older Afluria) 12/14/2015, 10/06/2018    Pneumococcal Conjugate 13-valent ([id number]) 09/20/2019    Pneumococcal Polysaccharide ([id number]) 10/21/2020    Tdap (Boostrix, Adacel) 06/30/2017    Zoster Live (Zostavax) 08/14/2015    Zoster Recombinant (Shingrix) 09/20/2019, 11/23/2019     Past medical, surgical, and social history reviewed and updated. Medications, immunizations, and allergies reviewed and updated     Review of Systems   Constitutional: Negative for chills, fever and unexpected weight change. HENT: Positive for hearing loss (hard of hearing). Negative for congestion, rhinorrhea, sore throat and trouble swallowing. 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 Negative for pain and discharge. Respiratory: Negative for shortness of breath and wheezing. Cardiovascular: Negative for chest pain, palpitations and leg swelling. Gastrointestinal: Negative for abdominal pain, blood in stool, constipation and diarrhea. Some GERD   Endocrine: Negative for polyuria. Genitourinary: Negative for dysuria and flank pain. Musculoskeletal: Positive for arthralgias and joint swelling. Negative for back pain and neck pain. Knees bother him   Skin: Negative for color change, rash and wound. Allergic/Immunologic: Negative for environmental allergies and food allergies. Neurological: Negative for dizziness, syncope, speech difficulty, weakness, light-headedness and headaches. Hematological: Negative for adenopathy. Does not bruise/bleed easily. Psychiatric/Behavioral: Positive for sleep disturbance (not this past week). Negative for agitation and dysphoric mood. The patient is not nervous/anxious. OBJECTIVE:  /64   Pulse 69   Temp 97.8 °F (36.6 °C) (Temporal)   Ht 5' 11.5\" (1.816 m)   Wt 228 lb 6.4 oz (103.6 kg)   SpO2 99%   BMI 31.41 kg/m²     Physical Exam  Constitutional:       General: He is not in acute distress. Appearance: He is well-developed. HENT:      Head: Normocephalic and atraumatic. Right Ear: Tympanic membrane normal.      Left Ear: Tympanic membrane normal.      Nose: Nose normal. No rhinorrhea. Mouth/Throat:      Pharynx: Uvula midline. Eyes:      Pupils: Pupils are equal, round, and reactive to light. Neck:      Trachea: No tracheal deviation. Cardiovascular:      Rate and Rhythm: Normal rate and regular rhythm. Heart sounds: Normal heart sounds. No murmur. No friction rub. No gallop.     Pulmonary:      Effort: Pulmonary effort is normal. No respiratory distress. Breath sounds: Normal breath sounds. No wheezing or rales. Abdominal:      General: Bowel sounds are normal. There is no distension. Palpations: Abdomen is soft. Tenderness: There is no abdominal tenderness. There is no rebound. Musculoskeletal: Normal range of motion. General: No tenderness. Lymphadenopathy:      Cervical: No cervical adenopathy. Skin:     General: Skin is warm and dry. Findings: No erythema or rash. Neurological:      Mental Status: He is alert and oriented to person, place, and time. Cranial Nerves: No cranial nerve deficit. Deep Tendon Reflexes:      Reflex Scores:       Tricep reflexes are 2+ on the right side and 2+ on the left side. Bicep reflexes are 2+ on the right side and 2+ on the left side. Brachioradialis reflexes are 2+ on the right side and 2+ on the left side. Patellar reflexes are 2+ on the right side and 2+ on the left side. Psychiatric:         Speech: Speech normal.         Thought Content: Thought content does not include homicidal or suicidal ideation.        LABS:    Lab Results   Component Value Date    LABA1C 5.8 08/21/2020     Lab Results   Component Value Date    .8 08/21/2020     Lab Results   Component Value Date    PSA 0.53 08/21/2020    PSA 0.60 05/09/2018    PSA 0.82 02/15/2016       Lab Results   Component Value Date    TSH 3.08 08/21/2020    T4FREE 1.2 02/15/2016       Lab Results   Component Value Date     07/01/2020    K 4.6 07/01/2020     07/01/2020    CO2 25 07/01/2020    BUN 22 (H) 07/01/2020    CREATININE 1.1 07/01/2020    GLUCOSE 110 (H) 07/01/2020    CALCIUM 9.8 07/01/2020    PROT 6.2 (L) 11/08/2019    LABALBU 4.8 11/08/2019    BILITOT 0.4 11/08/2019    ALKPHOS 53 11/08/2019    AST 19 11/08/2019    ALT 21 11/08/2019    LABGLOM >60 07/01/2020    GFRAA >60 07/01/2020    AGRATIO 3.4 (H) 11/08/2019    GLOB 1.4 11/08/2019     Lab Results   Component Value Date    WBC 7.4 07/01/2020    HGB 14.8 07/01/2020    HCT 43.2 07/01/2020    MCV 94.6 07/01/2020     07/01/2020     ASSESSMENT/PLAN:  Meagan Liu is a 18-year-old male who comes to establish care today. He was a patient Dr. Guzman Ndiaye. Needs refill of his rosacea medication. Hx of A. fib, hypothyroidism, stroke, sleep apnea, GERD. Followed by cardiology and sleep medicine/pulmonology. Refill of Azelaic acid. History and physical performed, health maintenance up-to-date, no other concerns. 1. Encounter to establish care  -Chart/records reviewed, history and physical performed, health maintenance addressed and updated, presenting problems addressed. 2. Rosacea  -Minocycline 100 mg daily  - Azelaic Acid 15 % GEL; Apply 1 drop topically See Admin Instructions  Dispense: 1 Tube; Refill: 1    3. Longstanding persistent atrial fibrillation (HCC)  -cardizem 120 mg daily, eliquis 5 mg daily  -sees Jayne Bachelor APRN-CNP annually    4. Hypothyroidism, unspecified type  Synthroid 75 mcg daily  Lab Results   Component Value Date    TSH 3.08 08/21/2020    T4FREE 1.2 02/15/2016     5. Cerebrovascular accident (CVA), unspecified mechanism ([facility] [address]  -lipitor 80 mg daily  -No antiplatelet due to being on chronic anticoagulation with afib    6. Severe obstructive sleep apnea  -CPAP, sees sleep med Sylvester Doe, APRN-CNP annually    7. Gastroesophageal reflux disease, unspecified whether esophagitis present  -omeprazole 20 mg daily    8. Healthcare maintenance  -up to date    Reviewed treatment plan with patient. Patient verbalized understanding to treatment plan and questions were answered. Spent >25 minutes of face to face interaction with patient counseling on diagnoses and treatment plan    Return in about 9 months (around 8/6/2021) for a fib, stroke, rosacea, thyroid, check up. Jose G Mcnair.  Jim Wong.      11/6/2020

## 2020-11-13 RX ORDER — LEVOTHYROXINE SODIUM 0.07 MG/1
75 TABLET ORAL DAILY
Qty: 90 TABLET | Refills: 1 | Status: SHIPPED | OUTPATIENT
Start: 2020-11-13

## 2020-11-17 ENCOUNTER — VIRTUAL VISIT (OUTPATIENT)
Dept: PULMONOLOGY | Age: 65
End: 2020-11-17
Payer: MEDICARE

## 2020-11-17 ENCOUNTER — TELEPHONE (OUTPATIENT)
Dept: PULMONOLOGY | Age: 65
End: 2020-11-17

## 2020-11-17 PROCEDURE — 1123F ACP DISCUSS/DSCN MKR DOCD: CPT | Performed by: NURSE PRACTITIONER

## 2020-11-17 PROCEDURE — 99213 OFFICE O/P EST LOW 20 MIN: CPT | Performed by: NURSE PRACTITIONER

## 2020-11-17 PROCEDURE — 3017F COLORECTAL CA SCREEN DOC REV: CPT | Performed by: NURSE PRACTITIONER

## 2020-11-17 PROCEDURE — 4040F PNEUMOC VAC/ADMIN/RCVD: CPT | Performed by: NURSE PRACTITIONER

## 2020-11-17 PROCEDURE — G8427 DOCREV CUR MEDS BY ELIG CLIN: HCPCS | Performed by: NURSE PRACTITIONER

## 2020-11-17 ASSESSMENT — SLEEP AND FATIGUE QUESTIONNAIRES
HOW LIKELY ARE YOU TO NOD OFF OR FALL ASLEEP IN A CAR, WHILE STOPPED FOR A FEW MINUTES IN TRAFFIC: 0
ESS TOTAL SCORE: 7
HOW LIKELY ARE YOU TO NOD OFF OR FALL ASLEEP WHEN YOU ARE A PASSENGER IN A CAR FOR AN HOUR WITHOUT A BREAK: 1
HOW LIKELY ARE YOU TO NOD OFF OR FALL ASLEEP WHILE SITTING QUIETLY AFTER LUNCH WITHOUT ALCOHOL: 2
HOW LIKELY ARE YOU TO NOD OFF OR FALL ASLEEP WHILE WATCHING TV: 1
HOW LIKELY ARE YOU TO NOD OFF OR FALL ASLEEP WHILE SITTING AND TALKING TO SOMEONE: 0
HOW LIKELY ARE YOU TO NOD OFF OR FALL ASLEEP WHILE SITTING AND READING: 1
HOW LIKELY ARE YOU TO NOD OFF OR FALL ASLEEP WHILE SITTING INACTIVE IN A PUBLIC PLACE: 0
HOW LIKELY ARE YOU TO NOD OFF OR FALL ASLEEP WHILE LYING DOWN TO REST IN THE AFTERNOON WHEN CIRCUMSTANCES PERMIT: 2

## 2020-11-17 NOTE — TELEPHONE ENCOUNTER
I called and spoke to Noelle Massey at the central location for [address] and requested a compliance report. She sent a message to the compliance department. Please watch for compliance.

## 2020-11-17 NOTE — PATIENT INSTRUCTIONS

## 2020-11-17 NOTE — PROGRESS NOTES
2012    COLONOSCOPY  08/23/2017    polyp    ELBOW SURGERY Left     MOUTH SURGERY      [place] DECOMPRESS FOREARM,EXCIS MUSC/NERV Left 12/24/2018    LEFT CUBITAL TUNNEL RELEASE performed by Dallas Bar MD at [address]  11/06/2017    VASECTOMY      WISDOM TOOTH EXTRACTION  2015       Allergies:  is allergic to latex and sulfa antibiotics. Social History:    TOBACCO:   reports that he quit smoking about 11 years ago. His smoking use included cigarettes. He has a 38.00 pack-year smoking history. He has quit using smokeless tobacco.  ETOH:   reports current alcohol use of about 2.0 - 3.0 standard drinks of alcohol per week.     Family History:       Problem Relation Age of Onset    Heart Disease Mother     High Blood Pressure Mother     Arthritis Mother     Cancer Father         prostate    Alcohol Abuse Brother        Current Medications:    Current Outpatient Medications:     BIOTIN PO, Take by mouth, Disp: , Rfl:     NIACIN PO, Take by mouth, Disp: , Rfl:     levothyroxine (SYNTHROID) 75 MCG tablet, Take 1 tablet by mouth Daily, Disp: 90 tablet, Rfl: 1    Azelaic Acid 15 % GEL, Apply 1 drop topically See Admin Instructions, Disp: 1 Tube, Rfl: 1    dilTIAZem (CARDIZEM CD) 120 MG extended release capsule, Take 1 capsule by mouth daily, Disp: 90 capsule, Rfl: 2    apixaban (ELIQUIS) 5 MG TABS tablet, TAKE 1 TABLET TWICE DAILY, Disp: 90 tablet, Rfl: 2    atorvastatin (LIPITOR) 80 MG tablet, TAKE 1 TABLET BY MOUTH EVERY DAY, Disp: 90 tablet, Rfl: 1    minocycline (MINOCIN;DYNACIN) 100 MG capsule, TAKE 1 CAPSULE EVERY DAY, Disp: 90 capsule, Rfl: 1    omeprazole (PRILOSEC) 20 MG delayed release capsule, Take 1 capsule by mouth daily, Disp: 90 capsule, Rfl: 1    multivitamin-iron-minerals-folic acid (CENTRUM) chewable tablet, Take 1 tablet by mouth daily, Disp: , Rfl:     fluticasone (FLONASE) 50 MCG/ACT nasal spray, 1 spray by Nasal route daily, sleep apnea, comorbid conditions, recent stroke, daily alcohol use likely contributing    Plan:      -Continue auto CPAP 14-17 cm H2O  -Follow AHI and adjust pressure if needed  - Advised to use CPAP 6-8 hrs at night and during naps-need to continue to increase use. - Replacement of mask, tubing, head straps every 3-6 months or sooner if damaged. - Patient instructed to contact DME company for any mask, tubing or machine trouble shooting if problems arise.  - Sleep hygiene  -Recommend set bed/wake times, no naps in the daytime, no TV if wake at night, wear CPAP all night can just disconnect tubing when going to the bathroom if having a hard time getting mask back on  -Cognitive behavioral therapy was discussed with patient including stimulus control and sleep restriction  -No TV if wakes at night  -Advised to cut down/stop daily alcohol use. Discussed effects on alcohol and sleep- initially can worsen REINIER, and then can cause decrease in total sleep time and increased wakefulness in the second half of sleep  - Avoid sedatives, alcohol and caffeinated drinks at bed time. - Patient counseled to never drive or operate heavy machinery while fatigue, drowsy or sleepy- patient verbalized understanding and agrees. - Weight loss is recommended as a long-term intervention.    - Complications of REINIER if not treated were discussed with patient patient, including: systemic hypertension, pulmonary hypertension, cardiovascular morbidities, car accidents and all cause mortality.  -Patient education regarding sleep tips and CPAP cleaning recommendations     Follow up: 1 year (pt preference), sooner if needed    Consent for telehealth visit was obtained and is noted in chart      [address] Bruce Rincon is a 76 y.o. male being evaluated by a Virtual Visit (video visit) encounter to address concerns as mentioned above. A caregiver was present when appropriate.  Due to this being a TeleHealth encounter (During CHGWW-25 public health emergency), evaluation of the following organ systems was limited: Vitals/Constitutional/EENT/Resp/CV/GI//MS/Neuro/Skin/Heme-Lymph-Imm. Pursuant to the emergency declaration under the [address] [place] [address], [address] [place] [address] and the [employer] Act, this Virtual Visit was conducted with patient's (and/or legal guardian's) consent, to reduce the patient's risk of exposure to COVID-19 and provide necessary medical care. The patient (and/or legal guardian) has also been advised to contact this office for worsening conditions or problems, and seek emergency medical treatment and/or call 911 if deemed necessary. Patient identification was verified at the start of the visit: Yes      Services were provided through a video synchronous discussion virtually to substitute for in-person clinic visit. Patient and provider were located at their individual homes. --SILVIO Zhang CNP on 11/17/2020 at 10:38 AM    An electronic signature was used to authenticate this note.

## 2020-11-20 NOTE — TELEPHONE ENCOUNTER
CPAP compliance report from 10/20/2020-11/18/2020 on auto CPAP 14-17 cm H2O reviewed. Compliance is good 100%. AHI is good 2.2.

## 2020-12-04 RX ORDER — AZELAIC ACID 0.15 G/G
1 GEL TOPICAL SEE ADMIN INSTRUCTIONS
Qty: 50 G | Refills: 1 | Status: SHIPPED | OUTPATIENT
Start: 2020-12-04

## 2021-03-01 ENCOUNTER — IMMUNIZATION (OUTPATIENT)
Dept: PRIMARY CARE CLINIC | Age: 66
End: 2021-03-01
Payer: MEDICARE

## 2021-03-01 PROCEDURE — 0001A COVID-19, PFIZER VACCINE 30MCG/0.3ML DOSE: CPT | Performed by: FAMILY MEDICINE

## 2021-03-01 PROCEDURE — 91300 COVID-19, PFIZER VACCINE 30MCG/0.3ML DOSE: CPT | Performed by: FAMILY MEDICINE

## 2021-03-22 ENCOUNTER — IMMUNIZATION (OUTPATIENT)
Dept: PRIMARY CARE CLINIC | Age: 66
End: 2021-03-22
Payer: MEDICARE

## 2021-03-22 PROCEDURE — 91300 COVID-19, PFIZER VACCINE 30MCG/0.3ML DOSE: CPT

## 2021-03-22 PROCEDURE — 0002A COVID-19, PFIZER VACCINE 30MCG/0.3ML DOSE: CPT

## 2021-05-14 ENCOUNTER — TELEPHONE (OUTPATIENT)
Dept: CASE MANAGEMENT | Age: 66
End: 2021-05-14

## 2021-05-14 DIAGNOSIS — Z87.891 FORMER CIGARETTE SMOKER: ICD-10-CM

## 2021-05-14 DIAGNOSIS — Z12.2 ENCOUNTER FOR SCREENING FOR LUNG CANCER: Primary | ICD-10-CM

## 2021-05-14 DIAGNOSIS — Z87.891 ENCOUNTER FOR SCREENING FOR MALIGNANT NEOPLASM OF LUNG IN FORMER SMOKER WHO QUIT IN PAST 15 YEARS WITH 30 PACK YEAR HISTORY OR GREATER: ICD-10-CM

## 2021-05-14 DIAGNOSIS — Z12.2 ENCOUNTER FOR SCREENING FOR MALIGNANT NEOPLASM OF LUNG IN FORMER SMOKER WHO QUIT IN PAST 15 YEARS WITH 30 PACK YEAR HISTORY OR GREATER: ICD-10-CM

## 2021-05-14 NOTE — TELEPHONE ENCOUNTER
Patient due for annual CT Lung Screening. If you would like patient to have screening, please place order for CT Lung Screening ([place] [id number]). Patient due for annual CT Lung Screening. Reminder letter mailed.       Thank you,  Arelis Goss Lung Navigator  [phone]

## 2021-06-07 ENCOUNTER — TELEPHONE (OUTPATIENT)
Dept: CASE MANAGEMENT | Age: 66
End: 2021-06-07

## 2021-06-07 NOTE — TELEPHONE ENCOUNTER
Patient due for annual CT Lung Screening. Reminder letter mailed.     Debra Foster [address] Lung Navigator  [phone]

## 2021-06-24 ENCOUNTER — TELEPHONE (OUTPATIENT)
Dept: CASE MANAGEMENT | Age: 66
End: 2021-06-24

## 2021-06-24 NOTE — TELEPHONE ENCOUNTER
Patient overdue for annual CT Lung Screening. Third and final reminder letter mailed.     Debra Foster [address] Lung Navigator  [phone]

## 2021-10-15 ENCOUNTER — CLINICAL DOCUMENTATION (OUTPATIENT)
Dept: OTHER | Age: 66
End: 2021-10-15

## 2021-11-16 ENCOUNTER — TELEPHONE (OUTPATIENT)
Dept: PULMONOLOGY | Age: 66
End: 2021-11-16

## 2021-11-16 NOTE — TELEPHONE ENCOUNTER
Within this Telehealth Consent, the terms you and yours refer to the person using the Telehealth Service (Service), or in the case of a use of the Service by or on behalf of a minor, you and yours refer to and include (i) the parent or legal guardian who provides consent to the use of the Service by such minor or uses the Service on behalf of such minor, and (ii) the minor for whom consent is being provided or on whose behalf the Service is being utilized. When using Service, you will be consulting with your health care providers via the use of Telehealth.   Telehealth involves the delivery of healthcare services using electronic communications, information technology or other means between a healthcare provider and a patient who are not in the same physical location. Telehealth may be used for diagnosis, treatment, follow-up and/or patient education, and may include, but is not limited to, one or more of the following:    Electronic transmission of medical records, photo images, personal health information or other data between a patient and a healthcare provider    Interactions between a patient and healthcare provider via audio, video and/or data communications    Use of output data from medical devices, sound and video files    Anticipated Benefits   The use of Telehealth by your Provider(s) through the Service may have the following possible benefits:    Making it easier and more efficient for you to access medical care and treatment for the conditions treated by such Provider(s) utilizing the Service    Allowing you to obtain medical care and treatment by Provider(s) at times that are convenient for you    Enabling you to interact with Provider(s) without the necessity of an in-office appointment     Possible Risks   While the use of Telehealth can provide potential benefits for you, there are also potential risks associated with the use of Telehealth.  These risks include, but may not be limited to the following:    Your Provider(s) may not able to provide medical treatment for your particular condition and you may be required to seek alternative healthcare or emergency care services.  The electronic systems or other security protocols or safeguards used in the Service could fail, causing a breach of privacy of your medical or other information.  Given regulatory requirements in certain jurisdictions, your Provider(s) diagnosis and/or treatment options, especially pertaining to certain prescriptions, may be limited. Acceptance   1. You understand that Services will be provided via Telehealth. This process involves the use of HIPAA compliant and secure, real-time audio-visual interfacing with a qualified and appropriately trained provider located at [facility]. 2. You understand that, under no circumstances, will this session be recorded. 3. You understand that the Provider(s) at [facility] and other clinical participants will be party to the information obtained during the Telehealth session in accordance with best medical practices. 4. You understand that the information obtained during the Telehealth session will be used to help determine the most appropriate treatment options. 5. You understand that You have the right to revoke this consent at any point in time. 6. You understand that Telehealth is voluntary, and that continued treatment is not dependent upon consent. 7. You understand that, in the event of non-consent to Telehealth services and/or technical difficulties, you will obtain services as typically provided in the absence of Telehealth technology. 8. You understand that this consent will be kept in Your medical record. 9. No potential benefits from the use of Telehealth or specific results can be guaranteed. Your condition may not be cured or improved and, in some cases, may get worse.    10. There are limitations in the provision of medical care and treatment via Telehealth and the Service and you may not be able to receive diagnosis and/or treatment through the Service for every condition for which you seek diagnosis and/or treatment. 11. There are potential risks to the use of Telehealth, including but not limited to the risks described in this Telehealth Consent. 12. Your Provider(s) have discussed the use of Telehealth and the Service with you, including the benefits and risks of such and you have provided oral consent to your Provider(s) for the use of Telehealth and the Service. 15. You understand that it is your duty to provide your Provider(s) truthful, accurate and complete information, including all relevant information regarding care that you may have received or may be receiving from other healthcare providers outside of the Service. 14. You understand that each of your Provider(s) may determine in his or sole discretion that your condition is not suitable for diagnosis and/or treatment using the Service, and that you may need to seek medical care and treatment a specialist or other healthcare provider, outside of the Service. 15. You understand that you are fully responsible for payment for all services provided by Provider(s) or through use of the Service and that you may not be able to use third-party insurance. 16. You represent that (a) you have read this Telehealth Consent carefully, (b) you understand the risks and benefits of the Service and the use of Telehealth in the medical care and treatment provided to you by Provider(s) using the Service, and (c) you have the legal capacity and authority to provide this consent for yourself and/or the minor for which you are consenting under applicable federal and state laws, including laws relating to the age of [de-identified] and/or parental/guardian consent.    17. You give your informed consent to the use of Telehealth by Provider(s) using the Service under the terms described in the Terms of Service and this Telehealth Consent. The patient was read the following statement and has consented to the visit as of 11/16/21. The patient has been scheduled for their first telehealth visit on 11/19/21 with Echo Siddiqi

## 2021-11-19 ENCOUNTER — VIRTUAL VISIT (OUTPATIENT)
Dept: PULMONOLOGY | Age: 66
End: 2021-11-19
Payer: MEDICARE

## 2021-11-19 ENCOUNTER — TELEPHONE (OUTPATIENT)
Dept: PULMONOLOGY | Age: 66
End: 2021-11-19

## 2021-11-19 DIAGNOSIS — E66.9 OBESITY (BMI 30.0-34.9): ICD-10-CM

## 2021-11-19 DIAGNOSIS — G47.10 HYPERSOMNIA: ICD-10-CM

## 2021-11-19 DIAGNOSIS — F51.04 PSYCHOPHYSIOLOGICAL INSOMNIA: ICD-10-CM

## 2021-11-19 DIAGNOSIS — Z78.9 ALCOHOL USE: ICD-10-CM

## 2021-11-19 DIAGNOSIS — G47.33 SEVERE OBSTRUCTIVE SLEEP APNEA: Primary | ICD-10-CM

## 2021-11-19 DIAGNOSIS — Z71.89 CPAP USE COUNSELING: ICD-10-CM

## 2021-11-19 PROBLEM — E66.811 OBESITY (BMI 30.0-34.9): Status: ACTIVE | Noted: 2021-11-19

## 2021-11-19 PROCEDURE — 1123F ACP DISCUSS/DSCN MKR DOCD: CPT | Performed by: NURSE PRACTITIONER

## 2021-11-19 PROCEDURE — 99214 OFFICE O/P EST MOD 30 MIN: CPT | Performed by: NURSE PRACTITIONER

## 2021-11-19 PROCEDURE — G8427 DOCREV CUR MEDS BY ELIG CLIN: HCPCS | Performed by: NURSE PRACTITIONER

## 2021-11-19 PROCEDURE — 3017F COLORECTAL CA SCREEN DOC REV: CPT | Performed by: NURSE PRACTITIONER

## 2021-11-19 PROCEDURE — 4040F PNEUMOC VAC/ADMIN/RCVD: CPT | Performed by: NURSE PRACTITIONER

## 2021-11-19 ASSESSMENT — SLEEP AND FATIGUE QUESTIONNAIRES
HOW LIKELY ARE YOU TO NOD OFF OR FALL ASLEEP WHILE WATCHING TV: 3
HOW LIKELY ARE YOU TO NOD OFF OR FALL ASLEEP WHILE LYING DOWN TO REST IN THE AFTERNOON WHEN CIRCUMSTANCES PERMIT: 3
HOW LIKELY ARE YOU TO NOD OFF OR FALL ASLEEP WHILE SITTING INACTIVE IN A PUBLIC PLACE: 3
ESS TOTAL SCORE: 21
HOW LIKELY ARE YOU TO NOD OFF OR FALL ASLEEP IN A CAR, WHILE STOPPED FOR A FEW MINUTES IN TRAFFIC: 1
HOW LIKELY ARE YOU TO NOD OFF OR FALL ASLEEP WHILE SITTING AND READING: 3
HOW LIKELY ARE YOU TO NOD OFF OR FALL ASLEEP WHILE SITTING AND TALKING TO SOMEONE: 2
HOW LIKELY ARE YOU TO NOD OFF OR FALL ASLEEP WHEN YOU ARE A PASSENGER IN A CAR FOR AN HOUR WITHOUT A BREAK: 3
HOW LIKELY ARE YOU TO NOD OFF OR FALL ASLEEP WHILE SITTING QUIETLY AFTER LUNCH WITHOUT ALCOHOL: 3

## 2021-11-19 NOTE — PROGRESS NOTES
Patient ID: Rosy Laird is a 78 y.o. male who is being seen today for   Chief Complaint   Patient presents with    Sleep Apnea     1 year          HPI:     Rosy Laird is a 78 y.o. male for televideo appointment via video and audio doxy. me virtual visit for REINIER follow up. States he is doing okay with CPAP. Patient is using CPAP   4-6 hrs/night. Using humidifier. No snoring on CPAP. The pressure is well tolerated. The mask is comfortable- full face. No mask leak. +daytime sleepiness. Denies nodding off when driving. No dry nose or throat. + fatigue. Bedtime is 10 pm- MN and rise time is 2-6 am. Sleep onset is few minutes. Wakes up 1 times at night total. 1 nocturia. It takes unknown minutes to fall back a sleep. 2-3 naps during the day. No headache in am. No weight gain. 2 caffienated beverages during the day. 3-4 oz alcohol/night.  ESS is 21          Sleep Medicine 11/19/2021 11/17/2020 11/14/2019 11/14/2019 7/30/2019 7/30/2019 5/10/2018   Sitting and reading 3 1 3 3 3 3 3   Watching TV 3 1 2 3 3 3 3   Sitting, inactive in a public place (e.g. a theatre or a meeting) 3 0 1 2 1 3 0   As a passenger in a car for an hour without a break 3 1 3 3 3 3 3   Lying down to rest in the afternoon when circumstances permit 3 2 3 3 3 3 3   Sitting and talking to someone 2 0 0 1 1 2 0   Sitting quietly after a lunch without alcohol 3 2 3 3 3 3 0   In a car, while stopped for a few minutes in traffic 1 0 0 0 0 0 0   Total score 21 7 15 18 17 20 12   Neck circumference - - - 16.5 - 17.25 17.25       Past Medical History:  Past Medical History:   Diagnosis Date    Arthritis     Cerebral artery occlusion with cerebral infarction ([facility] [address]     Chronic kidney disease     childhood nephritis    Rosacea     Sleep apnea     compliant with cpap    Thyroid disease        Past Surgical History:        Procedure Laterality Date    COLONOSCOPY  2012    COLONOSCOPY  08/23/2017    polyp    ELBOW SURGERY Left     MOUTH SURGERY      93% compliance and AHI 1.7 within this time frame. 28/30days with greater than 4 hours of machine use. 90% pressure 16.3 cm H20 on auto CPAP 14-17 cm H2O    Assessment:      · Severe REINIER. Auto CPAP 14-17 CM H2O. Optimal compliance on review today. · Hypersomnia-worse since stroke  · Snoring resolved on CPAP  · Sleep maintenance insomnia-poor sleep hygiene, sleep apnea, comorbid conditions, recent stroke, daily alcohol use likely contributing    Plan:      -Send order to change to auto CPAP 16-20 cm H2O  -Follow AHI and adjust pressure if needed  -Consider BiPAP titration if no improvement  - Advised to use CPAP 6-8 hrs at night and during naps-need to continue to increase use. - Replacement of mask, tubing, head straps every 3-6 months or sooner if damaged. - Patient instructed to contact [employer] company for any mask, tubing or machine trouble shooting if problems arise.  - Sleep hygiene  -Recommend set bed/wake times, no naps in the daytime, no TV if wake at night, wear CPAP all night can just disconnect tubing when going to the bathroom if having a hard time getting mask back on  -Cognitive behavioral therapy was discussed with patient including stimulus control and sleep restriction  -No TV if wakes at night  -Advised to cut down to stop daily alcohol use. Discussed effects on alcohol and sleep- initially can worsen REINIER, and then can cause decrease in total sleep time and increased wakefulness in the second half of sleep  - Avoid sedatives, alcohol and caffeinated drinks at bed time. - Patient counseled to never drive or operate heavy machinery while fatigue, drowsy or sleepy- patient verbalized understanding and agrees.    - Weight loss is recommended as a long-term intervention.    - Complications of REINIER if not treated were discussed with patient patient, including: systemic hypertension, pulmonary hypertension, cardiovascular morbidities, car accidents and all cause mortality.  -Patient education regarding sleep tips and CPAP cleaning recommendations     Follow up: 2 months, sooner if needed    Consent for telehealth visit was obtained and is noted in chart      [address] José Luis Epps is a 78 y.o. male being evaluated by a Virtual Visit (video visit) encounter to address concerns as mentioned above. A caregiver was present when appropriate. Due to this being a TeleHealth encounter (During JKKAI-56 public health emergency), evaluation of the following organ systems was limited: Vitals/Constitutional/EENT/Resp/CV/GI//MS/Neuro/Skin/Heme-Lymph-Imm. Pursuant to the emergency declaration under the [address] [place] [address], [address] [place] [address] authority and the [employer] and [employer] Act, this Virtual Visit was conducted with patient's (and/or legal guardian's) consent, to reduce the patient's risk of exposure to COVID-19 and provide necessary medical care. The patient (and/or legal guardian) has also been advised to contact this office for worsening conditions or problems, and seek emergency medical treatment and/or call 911 if deemed necessary. Patient identification was verified at the start of the visit: Yes      Services were provided through a video synchronous discussion virtually to substitute for in-person clinic visit. Patient and provider were located at their individual homes. --SILVIO Mckenzie CNP on 11/19/2021 at 3:00 PM    An electronic signature was used to authenticate this note.

## 2021-11-19 NOTE — PATIENT INSTRUCTIONS
Absolutely no driving if sleepy. It is your responsibility not to drive if fatigued, tired, or sleepy       Here are some tips to to getting better sleep  1- Avoid napping during the day: This will ensure you are tired at bedtime. If you have to take a nap, sleep less than one hour, before 3 pm.   2- Exercise regularly, but not right before bed: but the timing of the workout is important. Exercising in the morning or early afternoon will not interfere with sleep. Exercising within two hours before bedtime can decrease your ability to fall asleep. Regular exercise is recommended to help you deepen the sleep. 3- Avoid heavy, spicy, or sugary foods 4-6 hours before bedtime: These can affect your ability to stay asleep. 4- Have a light snack before bed: Having an empty stomach can interfere with your sleep. Dairy products and turkey contain tryptophan, which acts as a natural sleep inducer. 5- Stay away from caffeine, nicotine and alcohol at least 4-6 hours before bed: Caffeine and nicotine are stimulants that interfere with your ability to fall asleep. While alcohol has an immediate sleep-inducing effect, a few hours later, as alcohol levels in your blood start to fall, there is a stimulant effect and you will experience fragmented sleep. 6- Take a hot bath 90 minutes before bedtime:  A hot bath will raise your body temperature, but it is the drop in body temperature that may leave you feeling sleepy  7- Develop sleep rituals: it is important to give your body cues that it is time to slow down and sleep. Listen to relaxing music, read something soothing for 15 minutes, have a cup of caffeine free tea, or do relaxation exercises such as yoga or deep breathing help relieve anxiety and reduce muscle tension. 8- Fix a bedtime and an awakening time: Even on weekends! When your sleep cycle has a regular rhythm, you will feel better. 9- Sleep only when sleepy: This reduces the time you are awake in bed.    10- Get into your favorite sleeping position: If you can't fall asleep within 15-30 minutes, get up and do something boring until you feel sleepy. Sit quietly in the dark or read the warranty on your refrigerator. Don't expose yourself to bright light while you are up, it gives cues to your brain that it is time to wake up. 11- Only use your bed for sleeping: Dont use the bed as an office, workroom or recreation room. Let your body \"know\" that the bed is associated with sleeping  12- Use comfortable bedding. Uncomfortable bedding can prevent good sleep. Evaluate whether or not this is a source of your problem, and make appropriate changes. 13- Make sure your bed and bedroom are quiet and comfortable: A hot room can be uncomfortable. A cooler room, along with enough blankets to stay warm is recommended. Get a blackout shade or wear a slumber mask and wear earplugs or get a \"white noise\" machine for light and noise distractions. 14- Use sunlight to set your biological clock: When you get up in the morning, go outside and turn your face to the sun for 15 minutes. 13- Dont take your worries to bed: Leave worries about job, school, daily life, etc., behind when you go to bed. Some people find it useful to assign a \"worry period\" during the evening or afternoon for these issues.

## 2021-11-23 NOTE — TELEPHONE ENCOUNTER
Per [facility] pressure has not yet been changed. Will reach out to [facility] to confirm they received updated pressure change order.  Spoke with Via Brayden Tellez at [facility] states they do have the updated order and the RT is scheduled today to change pressure will watch to confirm pressure has been changed and get report in 1 month

## 2021-11-30 NOTE — TELEPHONE ENCOUNTER
Pt called stating that Normal just changed pressure on machine last night, and pt wasn't able to sleep with machine at higher pressure setting. Pt asking for pressure to be turned back down. Please advise. Compliance scanned for review.

## 2021-12-02 NOTE — TELEPHONE ENCOUNTER
Pressure change pended. Spoke to patient and he was very persistent that he just wants his pressure to be decreased and that he knows what is going on and the pressure just needs to be decreased.

## 2021-12-02 NOTE — TELEPHONE ENCOUNTER
Please make patient aware pressure was changed on 11/23/2021. Does he still feel like pressure is too high? Was mask fitting well that night? As large mask leak can contribute to pressure feeling higher as it is trying to compensate for the leak.     Can decrease to auto CPAP 15-19 cm H2O if needed

## 2021-12-07 NOTE — TELEPHONE ENCOUNTER
Spoke with Song Emerson at [facility] whom states that she doesn't have the new pressure change order. Printed and faxed again to [phone].

## 2022-01-07 NOTE — TELEPHONE ENCOUNTER
CPAP download report from 12/8/2021-1/6/2022 on auto CPAP 15-19 cm H2O reviewed. Compliance is good 90%. AHI is good 1.3.

## 2022-03-18 ENCOUNTER — TELEPHONE (OUTPATIENT)
Dept: PULMONOLOGY | Age: 67
End: 2022-03-18

## 2022-03-18 ENCOUNTER — TELEMEDICINE (OUTPATIENT)
Dept: PULMONOLOGY | Age: 67
End: 2022-03-18
Payer: MEDICARE

## 2022-03-18 DIAGNOSIS — G47.10 HYPERSOMNIA: ICD-10-CM

## 2022-03-18 DIAGNOSIS — E66.9 OBESITY (BMI 30.0-34.9): ICD-10-CM

## 2022-03-18 DIAGNOSIS — Z72.821 POOR SLEEP HYGIENE: ICD-10-CM

## 2022-03-18 DIAGNOSIS — Z71.89 CPAP USE COUNSELING: ICD-10-CM

## 2022-03-18 DIAGNOSIS — F51.04 PSYCHOPHYSIOLOGICAL INSOMNIA: ICD-10-CM

## 2022-03-18 DIAGNOSIS — Z78.9 ALCOHOL USE: ICD-10-CM

## 2022-03-18 DIAGNOSIS — G47.33 SEVERE OBSTRUCTIVE SLEEP APNEA: Primary | ICD-10-CM

## 2022-03-18 PROCEDURE — 4040F PNEUMOC VAC/ADMIN/RCVD: CPT | Performed by: NURSE PRACTITIONER

## 2022-03-18 PROCEDURE — 3017F COLORECTAL CA SCREEN DOC REV: CPT | Performed by: NURSE PRACTITIONER

## 2022-03-18 PROCEDURE — G8427 DOCREV CUR MEDS BY ELIG CLIN: HCPCS | Performed by: NURSE PRACTITIONER

## 2022-03-18 PROCEDURE — 99213 OFFICE O/P EST LOW 20 MIN: CPT | Performed by: NURSE PRACTITIONER

## 2022-03-18 PROCEDURE — 1123F ACP DISCUSS/DSCN MKR DOCD: CPT | Performed by: NURSE PRACTITIONER

## 2022-03-18 RX ORDER — SILDENAFIL 50 MG/1
50 TABLET, FILM COATED ORAL DAILY PRN
COMMUNITY
Start: 2021-09-01

## 2022-03-18 RX ORDER — TRAZODONE HYDROCHLORIDE 50 MG/1
TABLET ORAL
COMMUNITY
Start: 2022-03-09

## 2022-03-18 ASSESSMENT — SLEEP AND FATIGUE QUESTIONNAIRES
HOW LIKELY ARE YOU TO NOD OFF OR FALL ASLEEP WHILE WATCHING TV: 3
HOW LIKELY ARE YOU TO NOD OFF OR FALL ASLEEP WHILE LYING DOWN TO REST IN THE AFTERNOON WHEN CIRCUMSTANCES PERMIT: 3
HOW LIKELY ARE YOU TO NOD OFF OR FALL ASLEEP WHILE SITTING INACTIVE IN A PUBLIC PLACE: 3
HOW LIKELY ARE YOU TO NOD OFF OR FALL ASLEEP WHILE SITTING QUIETLY AFTER LUNCH WITHOUT ALCOHOL: 3
HOW LIKELY ARE YOU TO NOD OFF OR FALL ASLEEP WHEN YOU ARE A PASSENGER IN A CAR FOR AN HOUR WITHOUT A BREAK: 3
ESS TOTAL SCORE: 20
HOW LIKELY ARE YOU TO NOD OFF OR FALL ASLEEP IN A CAR, WHILE STOPPED FOR A FEW MINUTES IN TRAFFIC: 2
HOW LIKELY ARE YOU TO NOD OFF OR FALL ASLEEP WHILE SITTING AND TALKING TO SOMEONE: 0
HOW LIKELY ARE YOU TO NOD OFF OR FALL ASLEEP WHILE SITTING AND READING: 3

## 2022-03-18 NOTE — PROGRESS NOTES
Patient ID: Raya Khan is a 78 y.o. male who is being seen today for   Chief Complaint   Patient presents with    Follow-up     31-90d         HPI:     Raya Khan is a 78 y.o. male for televideo appointment via video and audio doxy. me virtual visit for REINIER follow up. States he is doing ok with CPAP. Dates he does have travel unit that he used for about a week on trip. Patient is using CPAP   6-8 hrs/night. Using humidifier. No snoring on CPAP. The pressure is well tolerated. The mask is comfortable- full face. No mask leak. Some daytime sleepiness- states better since started trazodone, started about 1 week ago per PCP. Denies nodding off when driving. No dry nose or throat. Some  fatigue. Bedtime is 10 pm-MN and rise time is 6 am. Sleep onset is few minutes. Wakes up 1 times at night total. 1 nocturia. It takes few minutes to fall back a sleep. No naps during the day. No headache in am. No weight gain. 2 caffienated beverages during the day. 3-4 oz alcohol a night alcohol. ESS is 22    No cataplexy  No hallucinations when falling or waking from sleep  No sleep paralysis          Previous HPI 11/19/21  Raya Khan is a 78 y.o. male for televideo appointment via video and audio doxy. me virtual visit for REINIER follow up. States he is doing okay with CPAP. Patient is using CPAP   4-6 hrs/night. Using humidifier. No snoring on CPAP. The pressure is well tolerated. The mask is comfortable- full face. No mask leak. +daytime sleepiness. Denies nodding off when driving. No dry nose or throat. + fatigue. Bedtime is 10 pm- MN and rise time is 2-6 am. Sleep onset is few minutes. Wakes up 1 times at night total. 1 nocturia. It takes unknown minutes to fall back a sleep. 2-3 naps during the day. No headache in am. No weight gain. 2 caffienated beverages during the day. 3-4 oz alcohol/night.  ESS is 21          Sleep Medicine 3/18/2022 11/19/2021 11/17/2020 11/14/2019 11/14/2019 7/30/2019 7/30/2019   Sitting and reading 3 3 1 3 3 3 3   Watching TV 3 3 1 2 3 3 3   Sitting, inactive in a public place (e.g. a theatre or a meeting) 3 3 0 1 2 1 3   As a passenger in a car for an hour without a break 3 3 1 3 3 3 3   Lying down to rest in the afternoon when circumstances permit 3 3 2 3 3 3 3   Sitting and talking to someone 0 2 0 0 1 1 2   Sitting quietly after a lunch without alcohol 3 3 2 3 3 3 3   In a car, while stopped for a few minutes in traffic 2 1 0 0 0 0 0   Total score 20 21 7 15 18 17 20   Neck circumference (Inches) - - - - 16.5 - 17.25       Past Medical History:  Past Medical History:   Diagnosis Date    Arthritis     [facility]bral artery occlusion with [facility] infarction ([facility] [address]     Chronic kidney disease     childhood nephritis    Rosacea     Sleep apnea     compliant with cpap    Thyroid disease        Past Surgical History:        Procedure Laterality Date    COLONOSCOPY  2012    COLONOSCOPY  08/23/2017    polyp    ELBOW SURGERY Left     MOUTH SURGERY      [place] DEC[facility]PRESS FOREARM,EXCIS MUSC/NERV Left 12/24/2018    LEFT CUBITAL TUNNEL RELEASE performed by Alice Leonard MD at [address]  11/06/2017    VASECTOMY      WISDOM TOOTH EXTRACTION  2015       Allergies:  is allergic to latex and sulfa antibiotics. Social History:    TOBACCO:   reports that he quit smoking about 13 years ago. His smoking use included cigarettes. He has a 38.00 pack-year smoking history. He has quit using smokeless tobacco.  ETOH:   reports current alcohol use of about 2.0 - 3.0 standard drinks of alcohol per week.     Family History:       Problem Relation Age of Onset    Heart Disease Mother     High Blood Pressure Mother     Arthritis Mother     Cancer Father         prostate    Alcohol Abuse Brother        Current Medications:    Current Outpatient Medications:     traZODone (DESYREL) 50 MG tablet, TAKE 1 TABLET BY MOUTH NIGHTLY, Disp: , Rfl:     sildenafil (VIAGRA) 50 MG tablet, Take 50 mg by mouth daily as needed, Disp: , Rfl:     Azelaic Acid 15 % GEL, Apply 1 drop topically See Admin Instructions Apply to affected rosacea/facial area up to 2 times a day, Disp: 50 g, Rfl: 1    apixaban (ELIQUIS) 5 MG TABS tablet, TAKE 1 TABLET TWICE DAILY, Disp: 180 tablet, Rfl: 1    BIOTIN PO, Take by mouth, Disp: , Rfl:     NIACIN PO, Take by mouth, Disp: , Rfl:     levothyroxine (SYNTHROID) 75 MCG tablet, Take 1 tablet by mouth Daily, Disp: 90 tablet, Rfl: 1    dilTIAZem (CARDIZEM CD) 120 MG extended release capsule, Take 1 capsule by mouth daily, Disp: 90 capsule, Rfl: 2    atorvastatin (LIPITOR) 80 MG tablet, TAKE 1 TABLET BY MOUTH EVERY DAY, Disp: 90 tablet, Rfl: 1    minocycline (MINOCIN;DYNACIN) 100 MG capsule, TAKE 1 CAPSULE EVERY DAY, Disp: 90 capsule, Rfl: 1    omeprazole (PRILOSEC) 20 MG delayed release capsule, Take 1 capsule by mouth daily, Disp: 90 capsule, Rfl: 1    multivitamin-iron-minerals-folic acid (CENTRUM) chewable tablet, Take 1 tablet by mouth daily, Disp: , Rfl:     fluticasone (FLONASE) 50 MCG/ACT nasal spray, 1 spray by Nasal route daily, Disp: , Rfl:       Objective:   PHYSICAL EXAM:    There were no vitals taken for this visit. Exam:  Gen: No acute distress, does not appear to be in pain. Appears well developed and nourished. HENT: Head is normocephalic and atraumatic. Normal appearing nose. External Ears normal.   Neck: No visualized mass. Trachea is midline   Eyes: EOM intact. No visible discharge. Resp:No visualized signs of difficulty breathing or respiratory distress, speaking in full sentences. Respiratory effort normal.  Neuro: Awake. Alert. Able to follow commands. No facial asymmetry. Psych: Oriented x 3. No anxiety. Normal affect. DATA:   HST 1/31/18 AHI 41.9, low SPO2 75%.   Started auto CPAP 8-16 CM H2O    CPAP compliance data:  Compliance download report from 4/10/18 to 5/9/18 reviewed today by me and showed patient is using machine 6:17 hrs/night with 97% compliance and AHI 4.0 within this time frame. 29/30days with greater than 4 hours of machine use. 90% pressure 13.8 cm H20 on auto CPAP 8-16    Compliance download report from 6/30/19 to 7/29/19 reviewed today by me and showed patient is using machine 4:19 hrs/night with 43% compliance and AHI 2.7 within this time frame. 13/30days with greater than 4 hours of machine use. 90% pressure 14.6 cm H20 on auto CPAP 11-16 cm H2O    Compliance download report from 10/15/19 to 11/13/19 reviewed today by me and showed patient is using machine 5:45 hrs/night with 63% compliance and AHI 1.6 within this time frame. 19/30days with greater than 4 hours of machine use. (states went on trip and used travel CPAP for 1 week)  90% pressure 15 cm H20 on auto CPAP 13-17 cm H2O    11/17/2020-unable to obtain CPAP download report-requested from [facility]    CPAP compliance report from 10/20/2020-11/18/2020 on auto CPAP 14-17 cm H2O reviewed. Compliance is good 100%. AHI is good 2.2. Compliance download report from 10/19/21 to 11/17/21 reviewed today by me and showed patient is using machine 5:59 hrs/night with 93% compliance and AHI 1.7 within this time frame. 28/30days with greater than 4 hours of machine use. 90% pressure 16.3 cm H20 on auto CPAP 14-17 cm H2O    Compliance download report from 2/13/22 to 3/14/22 reviewed today by me and showed patient is using machine 6:09 hrs/night with 63% compliance and AHI 1.5 within this time frame. 19/30days with greater than 4 hours of machine use. 90% pressure 16.9 cm H20 on auto CPAP 15-19 cm H2O    Assessment:      · Severe REINIER. Auto CPAP 14-17 CM H2O. Optimal compliance on review today (compliant per patient, states travel CPAP for about a week).      · Hypersomnia-worse since stroke but better since starting trazodone  · Snoring- resolved on CPAP  · Sleep maintenance insomnia-poor sleep hygiene, sleep apnea, comorbid conditions, recent stroke, daily alcohol use likely contributing-improved with trazodone use  · Obesity    Plan:      -Continue auto CPAP 15-19 cm H2O  -Consider BiPAP titration or MSLT if no improvement  - Advised to use CPAP 6-8 hrs at night and during naps-need to continue to increase use. - Replacement of mask, tubing, head straps every 3-6 months or sooner if damaged. - Patient instructed to contact DME company for any mask, tubing or machine trouble shooting if problems arise.  - Sleep hygiene  -Recommend set bed/wake times, no naps in the daytime, no TV if wake at night, wear CPAP all night can just disconnect tubing when going to the bathroom if having a hard time getting mask back on  -Cognitive behavioral therapy was discussed with patient including stimulus control and sleep restriction  -No TV if wakes at night  -Advised to cut down to stop daily alcohol use. Discussed effects on alcohol and sleep- initially can worsen REINIER, and then can cause decrease in total sleep time and increased wakefulness in the second half of sleep  - Avoid sedatives, alcohol and caffeinated drinks at bed time. - Patient counseled to never drive or operate heavy machinery while fatigue, drowsy or sleepy- patient verbalized understanding and agrees. - Weight loss is recommended as a long-term intervention.    - Complications of REINIER if not treated were discussed with patient patient, including: systemic hypertension, pulmonary hypertension, cardiovascular morbidities, car accidents and all cause mortality.  -Patient education regarding sleep tips and CPAP cleaning recommendations     Follow up: 2 months, sooner if needed-patient declines 2-month appointment, prefers 1 year follow-up    Bernardo Santos was evaluated through a synchronous (real-time) audio-video encounter. The patient (or guardian if applicable) is aware that this is a billable service, which includes applicable co-pays.  This Virtual Visit was conducted with patient's (and/or legal guardian's) consent. The visit was conducted pursuant to the emergency declaration under the [address] [place] [address] authority and the [employer] and [employer] Act. Patient identification was verified, and a caregiver was present when appropriate. The patient was located at home in a state where the provider was licensed to provide care. Total time spent for this encounter: Not billed by time    --SILVIO Dunn CNP on 3/18/2022 at 9:03 AM    An electronic signature was used to authenticate this note.

## 2022-03-18 NOTE — PATIENT INSTRUCTIONS
Absolutely no driving if sleepy. It is your responsibility not to drive if fatigued, tired, or sleepy     Sleep Hygiene. .. Tips for better sleep. .. Avoid naps. This will ensure you are sleepy at bedtime. If you have to take a nap, sleep less than 1 hour, before 3 pm.  Sleep only when sleepy; this reduces the time you are awake in bed. Regular exercise is recommended to help you deepen your sleep, but not within 4-6 hours of your bedtime. Timing of exercise is important, aim to exercise early in the morning or early afternoon. A light snack may help you fall asleep. Warm milk and foods high in the amino acid tryptophan, such as bananas, may help you to sleep  Be sure to avoid heavy, spicy or sugary foods 4-6 hours before bedtime and avoid at snack time. Stay away from stimulants such as caffeine and nicotine for at least 4-6 hours before bed. Stimulants can interfere with your ability to fall asleep. Caffeine is found in tea, cola, coffee, cocoa and chocolate and is best avoided at bedtime. Nicotine is found in tobacco products. Avoid alcohol 4-6 hours before bedtime. Alcohol has an immediate sleep-inducing effect, after a few hours when alcohol levels fall there is a stimulant or wake-up effect and will cause fragmented sleep. Sleep rituals are important. Give your body clues it is time to slow down and sleep. Examples include; yoga, deep breathing, listen to relaxing music, a hot bath or a few minutes of reading. Have a fixed bedtime and awakening time, Even on weekends! You will feel better keeping a regular sleep cycle, even if you are retired or not working. Get into your favorite sleep position. If not asleep in 30 minutes, get up and do something boring until you feel sleepy. Remember not to expose yourself to bright lights such as TV, phone or tablet screens. Only use your bed for sleeping. Do not use your bed as an office, workroom or recreation room. Use comfortable bedding.  Uncomfortable bedding can prevent good sleep. Ensure your bedroom is quiet and comfortable. A cooler room along with enough blankets to stay warm is recommended. If your room is too noisy, try a white noise machine. If too bright, try black out shades or an eye mask. Dont take worries to bed. Leave worries about work, school etc. behind you when you go to bed. Some people find it helpful to assign a worry period in the evening or late afternoon to write down your worries and get them out of your system. CPAP Equipment Cleaning and Disinfecting Schedule  Equipment Cleaning Frequency Instructions  Disinfecting Frequency   Non-Disposable Filters  Weekly Mild soapy water, Rinse, Air Dry Not Required   Disposable Filters Change as needed  2-4 weeks Do Not Wash Not Required   Hose/tubing Daily Mild soapy water, Rinse, Air Dry Once a week   Mask / Nasal Pillows Daily Mild soapy water, Rinse, Air Dry Once a week   Headgear Weekly Hand wash, Mild soapy water, Rinse, Dry  Not Required   Humidifier Daily Empty water daily  Mild soapy water, Rinse well, Air Dry  Once a week   CPAP Unit As Needed Dust with damp cloth,  No detergents or sprays Not Required         Disinfect (per schedule) with 1 part white vinegar and 3 parts water- soak mask and water chamber for 30 minutes every 1-2 weeks, more often if sick. Allow water/vinegar mixture to run through tubing. Allow all equipment to air dry. Drying Hints:   Always hang tubing away from direct sunlight, as this will cause the tubing to become yellow, brittle and crack over a period of time. DO NOT attach the wet tubing to your CPAP unit to blow-dry it. The moisture from the tubing can drain back into your machine. Moisture in your unit can cause sudden pressure increases or short circuits  DO's and DON'Ts:  - Don't use alcohol-based products to clean your mask, because it can cause the materials to become hard and brittle.    - Don't put headgear in the washer or dryer  - Don't

## 2022-04-07 ENCOUNTER — TELEPHONE (OUTPATIENT)
Dept: CARDIOLOGY CLINIC | Age: 67
End: 2022-04-07

## 2022-04-07 NOTE — TELEPHONE ENCOUNTER
Spoke with patient. He has not followed up as scheduled and is not interested in making an appointment at this time for clearance. He states he will try to get clearance from his PCP and that they are the ones who prescribe his Eliquis anyway.

## 2022-04-07 NOTE — TELEPHONE ENCOUNTER
Pt needs cardiac clearance. Pt is having a procedure involving laser gum assisted surgery with IV conscious sedation using a combination of versed, fentanyl, and nubain and a per-sedative pill. Procedure is schedule for 04/18/2022. Pt needs to hold eliquis for 48 hours piror to procedure.    Last OV  06/11/2020  EKG 06/11/2020

## 2023-03-20 ENCOUNTER — OFFICE VISIT (OUTPATIENT)
Dept: PULMONOLOGY | Age: 68
End: 2023-03-20
Payer: MEDICARE

## 2023-03-20 VITALS
HEART RATE: 62 BPM | BODY MASS INDEX: 33.26 KG/M2 | HEIGHT: 71 IN | OXYGEN SATURATION: 98 % | SYSTOLIC BLOOD PRESSURE: 118 MMHG | DIASTOLIC BLOOD PRESSURE: 70 MMHG | WEIGHT: 237.6 LBS

## 2023-03-20 DIAGNOSIS — E66.9 OBESITY (BMI 30.0-34.9): ICD-10-CM

## 2023-03-20 DIAGNOSIS — Z78.9 ALCOHOL USE: ICD-10-CM

## 2023-03-20 DIAGNOSIS — Z71.89 CPAP USE COUNSELING: ICD-10-CM

## 2023-03-20 DIAGNOSIS — G47.33 SEVERE OBSTRUCTIVE SLEEP APNEA: Primary | ICD-10-CM

## 2023-03-20 DIAGNOSIS — R53.83 OTHER FATIGUE: ICD-10-CM

## 2023-03-20 DIAGNOSIS — F51.04 PSYCHOPHYSIOLOGICAL INSOMNIA: ICD-10-CM

## 2023-03-20 PROCEDURE — G8417 CALC BMI ABV UP PARAM F/U: HCPCS | Performed by: NURSE PRACTITIONER

## 2023-03-20 PROCEDURE — 1036F TOBACCO NON-USER: CPT | Performed by: NURSE PRACTITIONER

## 2023-03-20 PROCEDURE — 99214 OFFICE O/P EST MOD 30 MIN: CPT | Performed by: NURSE PRACTITIONER

## 2023-03-20 PROCEDURE — G8427 DOCREV CUR MEDS BY ELIG CLIN: HCPCS | Performed by: NURSE PRACTITIONER

## 2023-03-20 PROCEDURE — G8484 FLU IMMUNIZE NO ADMIN: HCPCS | Performed by: NURSE PRACTITIONER

## 2023-03-20 PROCEDURE — 1123F ACP DISCUSS/DSCN MKR DOCD: CPT | Performed by: NURSE PRACTITIONER

## 2023-03-20 PROCEDURE — 3017F COLORECTAL CA SCREEN DOC REV: CPT | Performed by: NURSE PRACTITIONER

## 2023-03-20 ASSESSMENT — SLEEP AND FATIGUE QUESTIONNAIRES
HOW LIKELY ARE YOU TO NOD OFF OR FALL ASLEEP WHILE WATCHING TV: 2
NECK CIRCUMFERENCE (INCHES): 17
HOW LIKELY ARE YOU TO NOD OFF OR FALL ASLEEP WHILE SITTING INACTIVE IN A PUBLIC PLACE: 0
HOW LIKELY ARE YOU TO NOD OFF OR FALL ASLEEP WHILE SITTING AND READING: 1
HOW LIKELY ARE YOU TO NOD OFF OR FALL ASLEEP WHILE SITTING QUIETLY AFTER LUNCH WITHOUT ALCOHOL: 1
ESS TOTAL SCORE: 6
HOW LIKELY ARE YOU TO NOD OFF OR FALL ASLEEP WHILE LYING DOWN TO REST IN THE AFTERNOON WHEN CIRCUMSTANCES PERMIT: 1
HOW LIKELY ARE YOU TO NOD OFF OR FALL ASLEEP WHILE SITTING AND TALKING TO SOMEONE: 0
HOW LIKELY ARE YOU TO NOD OFF OR FALL ASLEEP IN A CAR, WHILE STOPPED FOR A FEW MINUTES IN TRAFFIC: 0
HOW LIKELY ARE YOU TO NOD OFF OR FALL ASLEEP WHEN YOU ARE A PASSENGER IN A CAR FOR AN HOUR WITHOUT A BREAK: 1

## 2023-03-20 NOTE — PROGRESS NOTES
reviewed today by me and showed patient is using machine 5:59 hrs/night with 93% compliance and AHI 1.7 within this time frame. 28/30days with greater than 4 hours of machine use. 90% pressure 16.3 cm H20 on auto CPAP 14-17 cm H2O    Compliance download report from 2/13/22 to 3/14/22 reviewed today by me and showed patient is using machine 6:09 hrs/night with 63% compliance and AHI 1.5 within this time frame. 19/30days with greater than 4 hours of machine use. 90% pressure 16.9 cm H20 on auto CPAP 15-19 cm H2O    Compliance download report from 2/13/23 to 3/14/23 reviewed today by me and showed patient is using machine 6:30 hrs/night with 83% compliance and AHI 1.4 within this time frame. 25/30days with greater than 4 hours of machine use. 90% pressure 17.7 cm H20 on auto CPAP 15-19 cm H2O     Assessment:      Severe REINIER. Auto CPAP 15-19 CM H2O. Optimal compliance on review today    Hypersomnia-improved  Snoring- resolved on CPAP  Sleep maintenance insomnia-poor sleep hygiene, sleep apnea, comorbid conditions, recent stroke, daily alcohol use likely contributing-improved with trazodone use  Obesity    Plan:      -Continue auto CPAP 15-19 cm H2O  -Interpreted and reviewed CPAP download data with patient  -Discussed recommended cleaning and replacement of CPAP supplies  - Advised to use CPAP 6-8 hrs at night and during naps-need to continue to increase use. - Replacement of mask, tubing, head straps every 3-6 months or sooner if damaged.    - Patient instructed to contact [employer] for any mask, tubing or machine trouble shooting if problems arise.  - Sleep hygiene  -Recommend set bed/wake times, no naps in the daytime, no TV if wake at night, wear CPAP all night can just disconnect tubing when going to the bathroom if having a hard time getting mask back on  -Cognitive behavioral therapy was discussed with patient including stimulus control and sleep restriction  -No TV if wakes at night  -Advised to cut

## 2023-03-20 NOTE — PATIENT INSTRUCTIONS
your CPAP   equipment.  - Do follow the recommended cleaning schedule. - Do change your disposable filter frequently. Adapted From: [email or web address]. shtm.   These are general suggestions for all models please follow specific s recommendations and specific instructions

## 2023-07-06 ENCOUNTER — TELEPHONE (OUTPATIENT)
Dept: PULMONOLOGY | Age: 68
End: 2023-07-06

## 2023-07-06 NOTE — TELEPHONE ENCOUNTER
Pt called something is wrong with his machine and that Otelia Opitz is going to be contacting us for a new script.  Please watch for request.

## 2024-03-18 ENCOUNTER — OFFICE VISIT (OUTPATIENT)
Dept: PULMONOLOGY | Age: 69
End: 2024-03-18
Payer: MEDICARE

## 2024-03-18 ENCOUNTER — TELEPHONE (OUTPATIENT)
Dept: PULMONOLOGY | Age: 69
End: 2024-03-18

## 2024-03-18 VITALS
BODY MASS INDEX: 31.42 KG/M2 | SYSTOLIC BLOOD PRESSURE: 146 MMHG | OXYGEN SATURATION: 98 % | WEIGHT: 232 LBS | HEIGHT: 72 IN | DIASTOLIC BLOOD PRESSURE: 74 MMHG | HEART RATE: 79 BPM

## 2024-03-18 DIAGNOSIS — Z78.9 ALCOHOL USE: ICD-10-CM

## 2024-03-18 DIAGNOSIS — E66.9 OBESITY (BMI 30.0-34.9): ICD-10-CM

## 2024-03-18 DIAGNOSIS — F51.04 PSYCHOPHYSIOLOGICAL INSOMNIA: ICD-10-CM

## 2024-03-18 DIAGNOSIS — G47.33 SEVERE OBSTRUCTIVE SLEEP APNEA: Primary | ICD-10-CM

## 2024-03-18 DIAGNOSIS — Z71.89 CPAP USE COUNSELING: ICD-10-CM

## 2024-03-18 PROCEDURE — 1123F ACP DISCUSS/DSCN MKR DOCD: CPT | Performed by: NURSE PRACTITIONER

## 2024-03-18 PROCEDURE — 99214 OFFICE O/P EST MOD 30 MIN: CPT | Performed by: NURSE PRACTITIONER

## 2024-03-18 ASSESSMENT — SLEEP AND FATIGUE QUESTIONNAIRES
HOW LIKELY ARE YOU TO NOD OFF OR FALL ASLEEP IN A CAR, WHILE STOPPED FOR A FEW MINUTES IN TRAFFIC: WOULD NEVER DOZE
HOW LIKELY ARE YOU TO NOD OFF OR FALL ASLEEP WHILE WATCHING TV: SLIGHT CHANCE OF DOZING
ESS TOTAL SCORE: 4
HOW LIKELY ARE YOU TO NOD OFF OR FALL ASLEEP WHILE SITTING QUIETLY AFTER LUNCH WITHOUT ALCOHOL: MODERATE CHANCE OF DOZING
HOW LIKELY ARE YOU TO NOD OFF OR FALL ASLEEP WHILE LYING DOWN TO REST IN THE AFTERNOON WHEN CIRCUMSTANCES PERMIT: SLIGHT CHANCE OF DOZING
HOW LIKELY ARE YOU TO NOD OFF OR FALL ASLEEP WHEN YOU ARE A PASSENGER IN A CAR FOR AN HOUR WITHOUT A BREAK: WOULD NEVER DOZE
HOW LIKELY ARE YOU TO NOD OFF OR FALL ASLEEP WHILE SITTING AND READING: WOULD NEVER DOZE
HOW LIKELY ARE YOU TO NOD OFF OR FALL ASLEEP WHILE SITTING AND TALKING TO SOMEONE: WOULD NEVER DOZE
NECK CIRCUMFERENCE (INCHES): 17
HOW LIKELY ARE YOU TO NOD OFF OR FALL ASLEEP WHILE SITTING INACTIVE IN A PUBLIC PLACE: WOULD NEVER DOZE

## 2024-03-18 NOTE — PROGRESS NOTES
Patient ID: Albetro Frederick is a 68 y.o. male who is being seen today for   Chief Complaint   Patient presents with    Follow-up     Sleep             HPI:   Alberto Frederick is a 68 y.o. male in office for REINIER follow up.  States he is doing well with CPAP.  Patient is using CPAP  6-7 hrs/night. Using humidifier. No snoring on CPAP. The pressure is well tolerated. The mask is comfortable- full face. No mask leak. No significant daytime sleepiness. No nodding off when driving. No dry nose or throat. No fatigue. Bedtime is 11 pm and rise time is 6 am. Sleep onset is few minutes. Wakes up 0-1 times at night total. 0-1 nocturia. It takes usually few minutes to fall back a sleep. Rare naps during the day. No headache in am. No weight gain. 2 caffienated beverages during the day.  2-3 burbon drinks a nights- states sleeps better when drinks less. ESS is 4                3/18/2024     9:45 AM 3/20/2023    10:39 AM 3/18/2022     8:36 AM 11/19/2021     1:05 PM 11/17/2020    10:24 AM 11/14/2019    11:45 AM 11/14/2019    11:33 AM   Sleep Medicine   Sitting and reading 0 1 3 3 1 3 3   Watching TV 1 2 3 3 1 2 3   Sitting, inactive in a public place (e.g. a theatre or a meeting) 0 0 3 3 0 1 2   As a passenger in a car for an hour without a break 0 1 3 3 1 3 3   Lying down to rest in the afternoon when circumstances permit 1 1 3 3 2 3 3   Sitting and talking to someone 0 0 0 2 0 0 1   Sitting quietly after a lunch without alcohol 2 1 3 3 2 3 3   In a car, while stopped for a few minutes in traffic 0 0 2 1 0 0 0   [place] Sleepiness Score 4 6 20 21 7 15 18   Neck circumference (Inches) 17 17     16.5       Past Medical History:  Past Medical History:   Diagnosis Date    Arthritis     Cerebral artery occlusion with cerebral infarction (HCC)     Chronic kidney disease     childhood nephritis    Rosacea     Sleep apnea     compliant with cpap    Thyroid disease        Past Surgical History:        Procedure Laterality Date    COLONOSCOPY

## 2024-11-26 NOTE — PROGRESS NOTES
[facility]   Electrophysiology Note              Date:  July 24, 2019  Patient name: Rico Maldonado  YOB: 1955    Primary Care physician: Aman Kimble MD    HISTORY OF PRESENT ILLNESS: The patient is a 58 y.o.  male with a history of PAF, REINIER, CVA and hypothyroidism. He was evaluated in 8/2018 due to newly diagnosed asymptomatic afib. He spontaneously converted to sinus prior to cardioversion. Echo in 8/2018 showed an EF of 55% and mild left atrial enlargement. At his visit in 10/2018 his metoprolol was increased to 25mg po BID. In January 2019, he was in [place] [facility]. On 6/28/2019, he came to the ED with left sided weakness. CT showed nonocclusive thrombus involving the right A2 branch, was given TPA and transferred to [facility] for partial thrombectomy. On 7/4/2019, head CT showed small amount of petechial hemorrhage and neurosurgery approved starting Eliquis. Today he is being seen for PAF. EKG today shows afib, HR 66. He complains of chronic but worsening fatigue and trouble sleeping since CVA. Reports being in AFib since Friday according to application on his smartphone. Denies chest pain, palpitations, shortness of breath, and dizziness. Past Medical History:   has a past medical history of Arthritis, Cerebral artery occlusion with cerebral infarction [facility]), Chronic kidney disease, Rosacea, Sleep apnea, and Thyroid disease. Past Surgical History:   has a past surgical history that includes Colonoscopy (2012); [place] tooth extraction (2015); Colonoscopy (08/23/2017); Upper gastrointestinal endoscopy (11/06/2017); Vasectomy; Mouth surgery; Elbow surgery (Left); and pr decompress forearm,excis musc/nerv (Left, 12/24/2018). Home Medications:    Prior to Admission medications    Medication Sig Start Date End Date Taking?  Authorizing Provider   multivitamin-iron-minerals-folic acid (CENTRUM) chewable tablet Take 1 tablet by mouth daily   Yes Historical Provider, MD 65.8

## 2025-03-28 ENCOUNTER — TELEMEDICINE (OUTPATIENT)
Dept: PULMONOLOGY | Age: 70
End: 2025-03-28

## 2025-03-28 ENCOUNTER — TELEPHONE (OUTPATIENT)
Dept: PULMONOLOGY | Age: 70
End: 2025-03-28

## 2025-03-28 DIAGNOSIS — E66.811 OBESITY (BMI 30.0-34.9): ICD-10-CM

## 2025-03-28 DIAGNOSIS — G47.33 SEVERE OBSTRUCTIVE SLEEP APNEA: Primary | ICD-10-CM

## 2025-03-28 DIAGNOSIS — Z71.89 CPAP USE COUNSELING: ICD-10-CM

## 2025-03-28 ASSESSMENT — SLEEP AND FATIGUE QUESTIONNAIRES
HOW LIKELY ARE YOU TO NOD OFF OR FALL ASLEEP WHILE LYING DOWN TO REST IN THE AFTERNOON WHEN CIRCUMSTANCES PERMIT: MODERATE CHANCE OF DOZING
ESS TOTAL SCORE: 10
HOW LIKELY ARE YOU TO NOD OFF OR FALL ASLEEP WHEN YOU ARE A PASSENGER IN A CAR FOR AN HOUR WITHOUT A BREAK: MODERATE CHANCE OF DOZING
HOW LIKELY ARE YOU TO NOD OFF OR FALL ASLEEP IN A CAR, WHILE STOPPED FOR A FEW MINUTES IN TRAFFIC: WOULD NEVER DOZE
HOW LIKELY ARE YOU TO NOD OFF OR FALL ASLEEP WHILE SITTING INACTIVE IN A PUBLIC PLACE: SLIGHT CHANCE OF DOZING
HOW LIKELY ARE YOU TO NOD OFF OR FALL ASLEEP WHILE WATCHING TV: SLIGHT CHANCE OF DOZING
HOW LIKELY ARE YOU TO NOD OFF OR FALL ASLEEP WHILE SITTING AND TALKING TO SOMEONE: WOULD NEVER DOZE
HOW LIKELY ARE YOU TO NOD OFF OR FALL ASLEEP WHILE SITTING QUIETLY AFTER LUNCH WITHOUT ALCOHOL: HIGH CHANCE OF DOZING
HOW LIKELY ARE YOU TO NOD OFF OR FALL ASLEEP WHILE SITTING AND READING: SLIGHT CHANCE OF DOZING

## 2025-03-28 NOTE — PROGRESS NOTES
Patient ID: Alberto Frederick is a 69 y.o. male who is being seen today for   Chief Complaint   Patient presents with    Follow-up    Sleep Apnea           HPI:     Alberto Frederick is a 69 y.o. male for televideo appointment via video and audio virtual visit for REINIER follow up. States he is doing well with CPAP.   Patient is using CPAP   6-8 hrs/night. Using humidifier. No snoring on CPAP. The pressure is well tolerated. The mask is comfortable- full face. Minimal mask leak. No significant daytime sleepiness. No nodding off when driving. No dry nose or throat. No fatigue. Bedtime is 11 pm and rise time is 6 am. Sleep onset is few minutes. Wakes up 0-1 times at night total. 0-1 nocturia. It takes few minutes to fall back a sleep. Rare naps during the day. No headache in am. No weight gain. 2 caffienated beverages during the day. 3 alcoholic drinks/evening. ESS is 10            3/28/2025     2:35 PM 3/18/2024     9:45 AM 3/20/2023    10:39 AM 3/18/2022     8:36 AM 11/19/2021     1:05 PM 11/17/2020    10:24 AM 11/14/2019    11:45 AM   Sleep Medicine   Sitting and reading 1 0 1 3 3 1 3   Watching TV 1 1 2 3 3 1 2   Sitting, inactive in a public place (e.g. a theatre or a meeting) 1 0 0 3 3 0 1   As a passenger in a car for an hour without a break 2 0 1 3 3 1 3   Lying down to rest in the afternoon when circumstances permit 2 1 1 3 3 2 3   Sitting and talking to someone 0 0 0 0 2 0 0   Sitting quietly after a lunch without alcohol 3 2 1 3 3 2 3   In a car, while stopped for a few minutes in traffic 0 0 0 2 1 0 0   [place] Sleepiness Score 10 4 6 20 21 7 15   Neck (Inches)  17 17           Past Medical History:  Past Medical History:   Diagnosis Date    Arthritis     Cerebral artery occlusion with cerebral infarction (HCC)     Chronic kidney disease     childhood nephritis    Rosacea     Sleep apnea     compliant with cpap    Thyroid disease        Past Surgical History:        Procedure Laterality Date    COLONOSCOPY  2012

## 2025-03-28 NOTE — TELEPHONE ENCOUNTER
Faxed mask order and OV note to johnna pathak via [employer].    Pt bringing machine in on Monday to get it set.

## 2025-04-03 NOTE — TELEPHONE ENCOUNTER
Report scanned is report that was reviewed at appointment 3/28/2025.  AHI and compliance are good.  I do not believe we need a report at this time.  See message below

## 2025-04-08 NOTE — TELEPHONE ENCOUNTER
Patient called the office to say that he has forgot two Mondays in a row the come in to get his travel Cpap setting set.

## 2025-07-23 ENCOUNTER — TELEPHONE (OUTPATIENT)
Dept: PULMONOLOGY | Age: 70
End: 2025-07-23

## (undated) DEVICE — STERILE LATEX POWDER-FREE SURGICAL GLOVESWITH NITRILE COATING: Brand: PROTEXIS

## (undated) DEVICE — SPLINT ORTH W4XL15IN LAYERED FBRGLS FOAM PD BRTH BK MOLD

## (undated) DEVICE — COTTON UNDERCAST PADDING,CRIMPED FINISH: Brand: WEBRIL

## (undated) DEVICE — CURITY ABDOMINAL PAD: Brand: CURITY

## (undated) DEVICE — SOLUTION,SALINE,IRRGATION,500ML,STRL: Brand: MEDLINE

## (undated) DEVICE — Z CONVERTED USE 2273164 BANDAGE COMPR W4INXL4 1/2YD E EC SGL LAYERED CLP CLSR ECONO

## (undated) DEVICE — COTTON UNDERCAST PADDING,REGULAR FINISH: Brand: WEBRIL

## (undated) DEVICE — GLOVE SURG SZ 7 L12IN FNGR THK94MIL STD WHT ISOLEX LTX FREE

## (undated) DEVICE — Device

## (undated) DEVICE — SUTURE COAT VCRL SZ 4-0 L18IN ABSRB UD L19MM PS-2 1/2 CIR J496G

## (undated) DEVICE — SUTURE ETHLN SZ 4-0 L18IN NONABSORBABLE BLK L19MM PS-2 3/8 1667H

## (undated) DEVICE — 3M™ IOBAN™ 2 ANTIMICROBIAL INCISE DRAPE 6640EZ: Brand: IOBAN™ 2

## (undated) DEVICE — ROYAL SILK SURGICAL GOWN, XL: Brand: CONVERTORS

## (undated) DEVICE — BANDAGE COMPR W4INXL5YD TAN BRTH SELF ADH WRP W/ HND TEAR